# Patient Record
Sex: MALE | Race: WHITE | NOT HISPANIC OR LATINO | URBAN - METROPOLITAN AREA
[De-identification: names, ages, dates, MRNs, and addresses within clinical notes are randomized per-mention and may not be internally consistent; named-entity substitution may affect disease eponyms.]

---

## 2017-02-16 ENCOUNTER — OFFICE (AMBULATORY)
Dept: URBAN - METROPOLITAN AREA CLINIC 64 | Facility: CLINIC | Age: 62
End: 2017-02-16

## 2017-02-16 VITALS
WEIGHT: 220 LBS | HEIGHT: 71 IN | SYSTOLIC BLOOD PRESSURE: 119 MMHG | DIASTOLIC BLOOD PRESSURE: 63 MMHG | HEART RATE: 75 BPM

## 2017-02-16 DIAGNOSIS — Z86.010 PERSONAL HISTORY OF COLONIC POLYPS: ICD-10-CM

## 2017-02-16 DIAGNOSIS — K21.9 GASTRO-ESOPHAGEAL REFLUX DISEASE WITHOUT ESOPHAGITIS: ICD-10-CM

## 2017-02-16 DIAGNOSIS — R13.10 DYSPHAGIA, UNSPECIFIED: ICD-10-CM

## 2017-02-16 PROCEDURE — 99202 OFFICE O/P NEW SF 15 MIN: CPT | Performed by: INTERNAL MEDICINE

## 2017-02-16 RX ORDER — RANITIDINE 300 MG/1
300 TABLET ORAL
Qty: 30 | Refills: 7 | Status: ACTIVE

## 2017-03-15 ENCOUNTER — OFFICE (AMBULATORY)
Dept: URBAN - METROPOLITAN AREA CLINIC 64 | Facility: CLINIC | Age: 62
End: 2017-03-15

## 2017-03-15 ENCOUNTER — ON CAMPUS - OUTPATIENT (AMBULATORY)
Dept: URBAN - METROPOLITAN AREA HOSPITAL 2 | Facility: HOSPITAL | Age: 62
End: 2017-03-15

## 2017-03-15 VITALS
DIASTOLIC BLOOD PRESSURE: 80 MMHG | HEART RATE: 78 BPM | OXYGEN SATURATION: 94 % | DIASTOLIC BLOOD PRESSURE: 83 MMHG | DIASTOLIC BLOOD PRESSURE: 110 MMHG | SYSTOLIC BLOOD PRESSURE: 136 MMHG | SYSTOLIC BLOOD PRESSURE: 124 MMHG | RESPIRATION RATE: 16 BRPM | DIASTOLIC BLOOD PRESSURE: 111 MMHG | SYSTOLIC BLOOD PRESSURE: 134 MMHG | HEART RATE: 73 BPM | DIASTOLIC BLOOD PRESSURE: 88 MMHG | DIASTOLIC BLOOD PRESSURE: 78 MMHG | HEART RATE: 79 BPM | RESPIRATION RATE: 18 BRPM | SYSTOLIC BLOOD PRESSURE: 107 MMHG | SYSTOLIC BLOOD PRESSURE: 167 MMHG | SYSTOLIC BLOOD PRESSURE: 132 MMHG | DIASTOLIC BLOOD PRESSURE: 98 MMHG | DIASTOLIC BLOOD PRESSURE: 93 MMHG | WEIGHT: 225 LBS | DIASTOLIC BLOOD PRESSURE: 75 MMHG | SYSTOLIC BLOOD PRESSURE: 148 MMHG | HEART RATE: 74 BPM | OXYGEN SATURATION: 95 % | HEART RATE: 76 BPM | TEMPERATURE: 98.3 F | SYSTOLIC BLOOD PRESSURE: 143 MMHG | SYSTOLIC BLOOD PRESSURE: 129 MMHG | DIASTOLIC BLOOD PRESSURE: 86 MMHG | HEART RATE: 75 BPM | OXYGEN SATURATION: 97 % | HEIGHT: 71 IN | HEART RATE: 96 BPM | SYSTOLIC BLOOD PRESSURE: 147 MMHG | HEART RATE: 77 BPM | HEART RATE: 83 BPM | OXYGEN SATURATION: 93 %

## 2017-03-15 DIAGNOSIS — K20.8 OTHER ESOPHAGITIS: ICD-10-CM

## 2017-03-15 DIAGNOSIS — D12.3 BENIGN NEOPLASM OF TRANSVERSE COLON: ICD-10-CM

## 2017-03-15 DIAGNOSIS — Z86.010 PERSONAL HISTORY OF COLONIC POLYPS: ICD-10-CM

## 2017-03-15 DIAGNOSIS — D12.0 BENIGN NEOPLASM OF CECUM: ICD-10-CM

## 2017-03-15 DIAGNOSIS — K57.30 DIVERTICULOSIS OF LARGE INTESTINE WITHOUT PERFORATION OR ABS: ICD-10-CM

## 2017-03-15 DIAGNOSIS — D12.5 BENIGN NEOPLASM OF SIGMOID COLON: ICD-10-CM

## 2017-03-15 DIAGNOSIS — K64.1 SECOND DEGREE HEMORRHOIDS: ICD-10-CM

## 2017-03-15 DIAGNOSIS — K21.9 GASTRO-ESOPHAGEAL REFLUX DISEASE WITHOUT ESOPHAGITIS: ICD-10-CM

## 2017-03-15 DIAGNOSIS — R13.10 DYSPHAGIA, UNSPECIFIED: ICD-10-CM

## 2017-03-15 DIAGNOSIS — K22.2 ESOPHAGEAL OBSTRUCTION: ICD-10-CM

## 2017-03-15 PROBLEM — K22.8 OTHER SPECIFIED DISEASES OF ESOPHAGUS: Status: ACTIVE | Noted: 2017-03-15

## 2017-03-15 LAB
GI HISTOLOGY: A. UNSPECIFIED: (no result)
GI HISTOLOGY: PDF REPORT: (no result)

## 2017-03-15 PROCEDURE — 43235 EGD DIAGNOSTIC BRUSH WASH: CPT | Performed by: INTERNAL MEDICINE

## 2017-03-15 PROCEDURE — 88305 TISSUE EXAM BY PATHOLOGIST: CPT | Performed by: INTERNAL MEDICINE

## 2017-03-15 PROCEDURE — 45385 COLONOSCOPY W/LESION REMOVAL: CPT | Mod: 33 | Performed by: INTERNAL MEDICINE

## 2017-03-15 RX ORDER — PANTOPRAZOLE SODIUM 40 MG/1
40 TABLET, DELAYED RELEASE ORAL
Qty: 90 | Refills: 3 | Status: COMPLETED
Start: 2017-03-15 | End: 2017-12-13

## 2017-03-15 RX ADMIN — PROPOFOL: 10 INJECTION, EMULSION INTRAVENOUS at 13:48

## 2017-11-13 ENCOUNTER — OFFICE (AMBULATORY)
Dept: URBAN - METROPOLITAN AREA CLINIC 64 | Facility: CLINIC | Age: 62
End: 2017-11-13

## 2017-11-13 VITALS
WEIGHT: 233 LBS | HEIGHT: 71 IN | SYSTOLIC BLOOD PRESSURE: 137 MMHG | DIASTOLIC BLOOD PRESSURE: 93 MMHG | HEART RATE: 97 BPM

## 2017-11-13 DIAGNOSIS — K22.10 ULCER OF ESOPHAGUS WITHOUT BLEEDING: ICD-10-CM

## 2017-11-13 DIAGNOSIS — R13.10 DYSPHAGIA, UNSPECIFIED: ICD-10-CM

## 2017-11-13 DIAGNOSIS — R15.0 INCOMPLETE DEFECATION: ICD-10-CM

## 2017-11-13 PROCEDURE — 99214 OFFICE O/P EST MOD 30 MIN: CPT | Performed by: NURSE PRACTITIONER

## 2017-12-13 ENCOUNTER — OFFICE (AMBULATORY)
Dept: URBAN - METROPOLITAN AREA CLINIC 64 | Facility: CLINIC | Age: 62
End: 2017-12-13
Payer: COMMERCIAL

## 2017-12-13 ENCOUNTER — ON CAMPUS - OUTPATIENT (AMBULATORY)
Dept: URBAN - METROPOLITAN AREA HOSPITAL 2 | Facility: HOSPITAL | Age: 62
End: 2017-12-13

## 2017-12-13 VITALS
OXYGEN SATURATION: 94 % | WEIGHT: 230 LBS | HEIGHT: 71 IN | DIASTOLIC BLOOD PRESSURE: 99 MMHG | HEART RATE: 79 BPM | HEART RATE: 70 BPM | OXYGEN SATURATION: 95 % | RESPIRATION RATE: 18 BRPM | DIASTOLIC BLOOD PRESSURE: 81 MMHG | SYSTOLIC BLOOD PRESSURE: 140 MMHG | SYSTOLIC BLOOD PRESSURE: 130 MMHG | RESPIRATION RATE: 16 BRPM | SYSTOLIC BLOOD PRESSURE: 135 MMHG | DIASTOLIC BLOOD PRESSURE: 89 MMHG | SYSTOLIC BLOOD PRESSURE: 177 MMHG | HEART RATE: 64 BPM | TEMPERATURE: 97.7 F | HEART RATE: 68 BPM | DIASTOLIC BLOOD PRESSURE: 95 MMHG | OXYGEN SATURATION: 96 % | OXYGEN SATURATION: 97 % | SYSTOLIC BLOOD PRESSURE: 151 MMHG | SYSTOLIC BLOOD PRESSURE: 136 MMHG | DIASTOLIC BLOOD PRESSURE: 98 MMHG | DIASTOLIC BLOOD PRESSURE: 85 MMHG

## 2017-12-13 DIAGNOSIS — K22.70 BARRETT'S ESOPHAGUS WITHOUT DYSPLASIA: ICD-10-CM

## 2017-12-13 DIAGNOSIS — K20.8 OTHER ESOPHAGITIS: ICD-10-CM

## 2017-12-13 DIAGNOSIS — K22.2 ESOPHAGEAL OBSTRUCTION: ICD-10-CM

## 2017-12-13 DIAGNOSIS — K22.10 ULCER OF ESOPHAGUS WITHOUT BLEEDING: ICD-10-CM

## 2017-12-13 DIAGNOSIS — R13.10 DYSPHAGIA, UNSPECIFIED: ICD-10-CM

## 2017-12-13 LAB
GI HISTOLOGY: A. UNSPECIFIED: (no result)
GI HISTOLOGY: PDF REPORT: (no result)

## 2017-12-13 PROCEDURE — 43239 EGD BIOPSY SINGLE/MULTIPLE: CPT | Performed by: INTERNAL MEDICINE

## 2017-12-13 PROCEDURE — 88305 TISSUE EXAM BY PATHOLOGIST: CPT | Performed by: INTERNAL MEDICINE

## 2017-12-13 RX ORDER — PANTOPRAZOLE SODIUM 40 MG/1
40 TABLET, DELAYED RELEASE ORAL
Qty: 90 | Refills: 3 | Status: COMPLETED
Start: 2017-03-15 | End: 2017-12-13

## 2017-12-13 RX ORDER — OMEPRAZOLE 40 MG/1
40 CAPSULE, DELAYED RELEASE ORAL
Qty: 90 | Refills: 3 | Status: ACTIVE
Start: 2017-12-13

## 2017-12-13 RX ADMIN — PROPOFOL: 10 INJECTION, EMULSION INTRAVENOUS at 08:27

## 2018-03-08 ENCOUNTER — ON CAMPUS - OUTPATIENT (AMBULATORY)
Dept: URBAN - METROPOLITAN AREA HOSPITAL 2 | Facility: HOSPITAL | Age: 63
End: 2018-03-08

## 2018-03-08 VITALS
SYSTOLIC BLOOD PRESSURE: 134 MMHG | WEIGHT: 230 LBS | SYSTOLIC BLOOD PRESSURE: 140 MMHG | HEART RATE: 67 BPM | HEART RATE: 72 BPM | SYSTOLIC BLOOD PRESSURE: 146 MMHG | SYSTOLIC BLOOD PRESSURE: 125 MMHG | RESPIRATION RATE: 18 BRPM | SYSTOLIC BLOOD PRESSURE: 144 MMHG | TEMPERATURE: 97.5 F | OXYGEN SATURATION: 95 % | DIASTOLIC BLOOD PRESSURE: 79 MMHG | DIASTOLIC BLOOD PRESSURE: 86 MMHG | DIASTOLIC BLOOD PRESSURE: 75 MMHG | OXYGEN SATURATION: 96 % | DIASTOLIC BLOOD PRESSURE: 90 MMHG | RESPIRATION RATE: 16 BRPM | DIASTOLIC BLOOD PRESSURE: 76 MMHG | HEART RATE: 66 BPM | DIASTOLIC BLOOD PRESSURE: 91 MMHG | OXYGEN SATURATION: 97 % | HEIGHT: 71 IN | SYSTOLIC BLOOD PRESSURE: 126 MMHG | OXYGEN SATURATION: 94 % | HEART RATE: 68 BPM

## 2018-03-08 DIAGNOSIS — K22.2 ESOPHAGEAL OBSTRUCTION: ICD-10-CM

## 2018-03-08 DIAGNOSIS — K22.70 BARRETT'S ESOPHAGUS WITHOUT DYSPLASIA: ICD-10-CM

## 2018-03-08 DIAGNOSIS — R13.10 DYSPHAGIA, UNSPECIFIED: ICD-10-CM

## 2018-03-08 DIAGNOSIS — K21.9 GASTRO-ESOPHAGEAL REFLUX DISEASE WITHOUT ESOPHAGITIS: ICD-10-CM

## 2018-03-08 PROCEDURE — 43450 DILATE ESOPHAGUS 1/MULT PASS: CPT | Performed by: INTERNAL MEDICINE

## 2018-03-08 PROCEDURE — 43235 EGD DIAGNOSTIC BRUSH WASH: CPT | Performed by: INTERNAL MEDICINE

## 2018-03-08 RX ADMIN — PROPOFOL: 10 INJECTION, EMULSION INTRAVENOUS at 07:57

## 2018-08-04 ENCOUNTER — HOSPITAL ENCOUNTER (EMERGENCY)
Facility: HOSPITAL | Age: 63
Discharge: HOME | End: 2018-08-04
Attending: EMERGENCY MEDICINE | Admitting: EMERGENCY MEDICINE

## 2018-08-04 VITALS
SYSTOLIC BLOOD PRESSURE: 135 MMHG | HEART RATE: 70 BPM | HEIGHT: 71 IN | BODY MASS INDEX: 30.8 KG/M2 | DIASTOLIC BLOOD PRESSURE: 84 MMHG | TEMPERATURE: 97.5 F | RESPIRATION RATE: 16 BRPM | OXYGEN SATURATION: 95 % | WEIGHT: 220 LBS

## 2018-08-04 DIAGNOSIS — R33.9 URINARY RETENTION: Primary | ICD-10-CM

## 2018-08-04 DIAGNOSIS — Z56.6 STRESS AT WORK: ICD-10-CM

## 2018-08-04 LAB
ALBUMIN SERPL-MCNC: 4.1 G/DL (ref 3.5–5.2)
ALBUMIN/GLOB SERPL: 1.7 G/DL
ALP SERPL-CCNC: 83 U/L (ref 39–117)
ALT SERPL W P-5'-P-CCNC: 19 U/L (ref 1–41)
AMPHET+METHAMPHET UR QL: NEGATIVE
ANION GAP SERPL CALCULATED.3IONS-SCNC: 16.8 MMOL/L
AST SERPL-CCNC: 18 U/L (ref 1–40)
BARBITURATES UR QL SCN: NEGATIVE
BASOPHILS # BLD AUTO: 0.02 10*3/MM3 (ref 0–0.2)
BASOPHILS NFR BLD AUTO: 0.2 % (ref 0–1.5)
BENZODIAZ UR QL SCN: NEGATIVE
BILIRUB SERPL-MCNC: 0.5 MG/DL (ref 0.1–1.2)
BILIRUB UR QL STRIP: NEGATIVE
BUN BLD-MCNC: 18 MG/DL (ref 8–23)
BUN/CREAT SERPL: 14.8 (ref 7–25)
CALCIUM SPEC-SCNC: 9.4 MG/DL (ref 8.6–10.5)
CANNABINOIDS SERPL QL: NEGATIVE
CHLORIDE SERPL-SCNC: 102 MMOL/L (ref 98–107)
CLARITY UR: CLEAR
CO2 SERPL-SCNC: 20.2 MMOL/L (ref 22–29)
COCAINE UR QL: NEGATIVE
COLOR UR: YELLOW
CREAT BLD-MCNC: 1.22 MG/DL (ref 0.76–1.27)
DEPRECATED RDW RBC AUTO: 46.4 FL (ref 37–54)
EOSINOPHIL # BLD AUTO: 0 10*3/MM3 (ref 0–0.7)
EOSINOPHIL NFR BLD AUTO: 0 % (ref 0.3–6.2)
ERYTHROCYTE [DISTWIDTH] IN BLOOD BY AUTOMATED COUNT: 14.3 % (ref 11.5–14.5)
ETHANOL BLD-MCNC: <10 MG/DL (ref 0–10)
ETHANOL UR QL: <0.01 %
GFR SERPL CREATININE-BSD FRML MDRD: 60 ML/MIN/1.73
GLOBULIN UR ELPH-MCNC: 2.4 GM/DL
GLUCOSE BLD-MCNC: 100 MG/DL (ref 65–99)
GLUCOSE UR STRIP-MCNC: NEGATIVE MG/DL
HCT VFR BLD AUTO: 44.5 % (ref 40.4–52.2)
HGB BLD-MCNC: 14.2 G/DL (ref 13.7–17.6)
HGB UR QL STRIP.AUTO: NEGATIVE
HOLD SPECIMEN: NORMAL
HOLD SPECIMEN: NORMAL
IMM GRANULOCYTES # BLD: 0.02 10*3/MM3 (ref 0–0.03)
IMM GRANULOCYTES NFR BLD: 0.2 % (ref 0–0.5)
KETONES UR QL STRIP: NEGATIVE
LEUKOCYTE ESTERASE UR QL STRIP.AUTO: NEGATIVE
LYMPHOCYTES # BLD AUTO: 1.41 10*3/MM3 (ref 0.9–4.8)
LYMPHOCYTES NFR BLD AUTO: 14.4 % (ref 19.6–45.3)
MCH RBC QN AUTO: 28.3 PG (ref 27–32.7)
MCHC RBC AUTO-ENTMCNC: 31.9 G/DL (ref 32.6–36.4)
MCV RBC AUTO: 88.8 FL (ref 79.8–96.2)
METHADONE UR QL SCN: NEGATIVE
MONOCYTES # BLD AUTO: 0.5 10*3/MM3 (ref 0.2–1.2)
MONOCYTES NFR BLD AUTO: 5.1 % (ref 5–12)
NEUTROPHILS # BLD AUTO: 7.86 10*3/MM3 (ref 1.9–8.1)
NEUTROPHILS NFR BLD AUTO: 80.3 % (ref 42.7–76)
NITRITE UR QL STRIP: NEGATIVE
OPIATES UR QL: NEGATIVE
OXYCODONE UR QL SCN: NEGATIVE
PH UR STRIP.AUTO: 5.5 [PH] (ref 5–8)
PLATELET # BLD AUTO: 209 10*3/MM3 (ref 140–500)
PMV BLD AUTO: 9.5 FL (ref 6–12)
POTASSIUM BLD-SCNC: 3.9 MMOL/L (ref 3.5–5.2)
PROT SERPL-MCNC: 6.5 G/DL (ref 6–8.5)
PROT UR QL STRIP: NEGATIVE
RBC # BLD AUTO: 5.01 10*6/MM3 (ref 4.6–6)
SODIUM BLD-SCNC: 139 MMOL/L (ref 136–145)
SP GR UR STRIP: 1.01 (ref 1–1.03)
UROBILINOGEN UR QL STRIP: NORMAL
WBC NRBC COR # BLD: 9.79 10*3/MM3 (ref 4.5–10.7)
WHOLE BLOOD HOLD SPECIMEN: NORMAL
WHOLE BLOOD HOLD SPECIMEN: NORMAL

## 2018-08-04 PROCEDURE — 85025 COMPLETE CBC W/AUTO DIFF WBC: CPT | Performed by: NURSE PRACTITIONER

## 2018-08-04 PROCEDURE — 80307 DRUG TEST PRSMV CHEM ANLYZR: CPT | Performed by: EMERGENCY MEDICINE

## 2018-08-04 PROCEDURE — 51798 US URINE CAPACITY MEASURE: CPT

## 2018-08-04 PROCEDURE — 51702 INSERT TEMP BLADDER CATH: CPT

## 2018-08-04 PROCEDURE — 90791 PSYCH DIAGNOSTIC EVALUATION: CPT

## 2018-08-04 PROCEDURE — 81003 URINALYSIS AUTO W/O SCOPE: CPT | Performed by: NURSE PRACTITIONER

## 2018-08-04 PROCEDURE — 99284 EMERGENCY DEPT VISIT MOD MDM: CPT

## 2018-08-04 PROCEDURE — 80053 COMPREHEN METABOLIC PANEL: CPT | Performed by: NURSE PRACTITIONER

## 2018-08-04 RX ORDER — RANITIDINE 300 MG/1
300 TABLET ORAL NIGHTLY
COMMUNITY
End: 2021-09-13

## 2018-08-04 RX ORDER — OMEPRAZOLE 40 MG/1
40 CAPSULE, DELAYED RELEASE ORAL DAILY
COMMUNITY
End: 2021-09-13

## 2018-08-04 RX ORDER — TRAZODONE HYDROCHLORIDE 50 MG/1
50 TABLET ORAL NIGHTLY PRN
COMMUNITY
End: 2018-08-27 | Stop reason: HOSPADM

## 2018-08-04 RX ORDER — SODIUM CHLORIDE 0.9 % (FLUSH) 0.9 %
10 SYRINGE (ML) INJECTION AS NEEDED
Status: DISCONTINUED | OUTPATIENT
Start: 2018-08-04 | End: 2018-08-04 | Stop reason: HOSPADM

## 2018-08-04 RX ORDER — SILODOSIN 8 MG/1
8 CAPSULE ORAL
COMMUNITY
End: 2021-09-13

## 2018-08-04 RX ORDER — PAROXETINE HYDROCHLORIDE 20 MG/1
40 TABLET, FILM COATED ORAL EVERY MORNING
Status: ON HOLD | COMMUNITY
End: 2018-08-15

## 2018-08-04 SDOH — HEALTH STABILITY - MENTAL HEALTH: OTHER PHYSICAL AND MENTAL STRAIN RELATED TO WORK: Z56.6

## 2018-08-04 NOTE — ED NOTES
Access back at bedside to give dc instructions along w ed md      Shah retention bag has been changed to a leg bag and initial bag sent home w pt in personal belonging bag. Catheter care education given and both pt and wife confirm understanding.        Santa Payton, RN  08/04/18 7825

## 2018-08-04 NOTE — ED PROVIDER NOTES
EMERGENCY DEPARTMENT ENCOUNTER    Room Number:  28/28  Date seen:  8/4/2018  Time seen: 1:41 PM  PCP: Damon Bradford MD  Historian: Pt      HPI:  Chief Complaint: Urinary Retention  A complete HPI/ROS/PMH/PSH/SH/FH are unobtainable due to: N/A  Context: Chilo Hurt is a 63 y.o. male who presents to the ED c/o chronic urinary retention issues that got progressively wore last night. Pt has stark catheter. Pt confirms lower abd pain, trouble sleeping, difficulty urinating. Pt denies fever, N/V. Pt states he has been under a lot of stress. Pt denies any suicidal thoughts, hallucinations. Pt's wife wants a psych evaluation because she states pt's mind continues racing all the time and pt cannot sleep at night.     Pain Location: lower abd   Radiation: none  Quality: pain  Intensity/Severity: moderate   Duration: last night but is chronic problem       PAST MEDICAL HISTORY  Active Ambulatory Problems     Diagnosis Date Noted   • No Active Ambulatory Problems     Resolved Ambulatory Problems     Diagnosis Date Noted   • No Resolved Ambulatory Problems     Past Medical History:   Diagnosis Date   • Depression    • GERD (gastroesophageal reflux disease)    • Prostate enlargement    • Urinary retention          PAST SURGICAL HISTORY  History reviewed. No pertinent surgical history.      FAMILY HISTORY  History reviewed. No pertinent family history.      SOCIAL HISTORY  Social History     Social History   • Marital status:      Spouse name: N/A   • Number of children: N/A   • Years of education: N/A     Occupational History   • Not on file.     Social History Main Topics   • Smoking status: Never Smoker   • Smokeless tobacco: Not on file   • Alcohol use No   • Drug use: Unknown   • Sexual activity: Not on file     Other Topics Concern   • Not on file     Social History Narrative   • No narrative on file         ALLERGIES  Patient has no known allergies.        REVIEW OF SYSTEMS  Review of Systems    Constitutional: Negative for fever.   HENT: Negative for sore throat.    Respiratory: Negative for shortness of breath.    Cardiovascular: Negative for chest pain.   Gastrointestinal: Positive for abdominal pain (lower). Negative for nausea and vomiting.   Endocrine: Negative for polyuria.   Genitourinary: Positive for difficulty urinating. Negative for dysuria.        Urinary retention issues.    Musculoskeletal: Negative for neck pain.   Skin: Negative for rash.   Neurological: Negative for headaches.        Trouble sleeping.    Psychiatric/Behavioral: Negative for hallucinations and suicidal ideas.   All other systems reviewed and are negative.           PHYSICAL EXAM  ED Triage Vitals   Temp Heart Rate Resp BP SpO2   08/04/18 1237 08/04/18 1237 08/04/18 1248 08/04/18 1248 08/04/18 1237   97.2 °F (36.2 °C) (!) 125 22 101/90 91 %      Temp src Heart Rate Source Patient Position BP Location FiO2 (%)   08/04/18 1237 08/04/18 1237 -- 08/04/18 1248 --   Tympanic Monitor  Right arm          GENERAL: not distressed  HENT: nares patent  EYES: no scleral icterus  CV: regular rhythm, regular rate  RESPIRATORY: normal effort, CTAB  ABDOMEN: soft, nontender  MUSCULOSKELETAL: no deformity  NEURO: alert, ARCHER, FC  SKIN: warm, dry  : stark draining CYU    Vital signs and nursing notes reviewed.          LAB RESULTS  Recent Results (from the past 24 hour(s))   Urinalysis With Microscopic If Indicated (No Culture) - Urine, Catheter    Collection Time: 08/04/18  1:26 PM   Result Value Ref Range    Color, UA Yellow Yellow, Straw    Appearance, UA Clear Clear    pH, UA 5.5 5.0 - 8.0    Specific Gravity, UA 1.015 1.005 - 1.030    Glucose, UA Negative Negative    Ketones, UA Negative Negative    Bilirubin, UA Negative Negative    Blood, UA Negative Negative    Protein, UA Negative Negative    Leuk Esterase, UA Negative Negative    Nitrite, UA Negative Negative    Urobilinogen, UA 0.2 E.U./dL 0.2 - 1.0 E.U./dL   Urine Drug Screen -  Urine, Clean Catch    Collection Time: 08/04/18  1:26 PM   Result Value Ref Range    Amphet/Methamphet, Screen Negative Negative    Barbiturates Screen, Urine Negative Negative    Benzodiazepine Screen, Urine Negative Negative    Cocaine Screen, Urine Negative Negative    Opiate Screen Negative Negative    THC, Screen, Urine Negative Negative    Methadone Screen, Urine Negative Negative    Oxycodone Screen, Urine Negative Negative   Comprehensive Metabolic Panel    Collection Time: 08/04/18  2:03 PM   Result Value Ref Range    Glucose 100 (H) 65 - 99 mg/dL    BUN 18 8 - 23 mg/dL    Creatinine 1.22 0.76 - 1.27 mg/dL    Sodium 139 136 - 145 mmol/L    Potassium 3.9 3.5 - 5.2 mmol/L    Chloride 102 98 - 107 mmol/L    CO2 20.2 (L) 22.0 - 29.0 mmol/L    Calcium 9.4 8.6 - 10.5 mg/dL    Total Protein 6.5 6.0 - 8.5 g/dL    Albumin 4.10 3.50 - 5.20 g/dL    ALT (SGPT) 19 1 - 41 U/L    AST (SGOT) 18 1 - 40 U/L    Alkaline Phosphatase 83 39 - 117 U/L    Total Bilirubin 0.5 0.1 - 1.2 mg/dL    eGFR Non African Amer 60 (L) >60 mL/min/1.73    Globulin 2.4 gm/dL    A/G Ratio 1.7 g/dL    BUN/Creatinine Ratio 14.8 7.0 - 25.0    Anion Gap 16.8 mmol/L   CBC Auto Differential    Collection Time: 08/04/18  2:03 PM   Result Value Ref Range    WBC 9.79 4.50 - 10.70 10*3/mm3    RBC 5.01 4.60 - 6.00 10*6/mm3    Hemoglobin 14.2 13.7 - 17.6 g/dL    Hematocrit 44.5 40.4 - 52.2 %    MCV 88.8 79.8 - 96.2 fL    MCH 28.3 27.0 - 32.7 pg    MCHC 31.9 (L) 32.6 - 36.4 g/dL    RDW 14.3 11.5 - 14.5 %    RDW-SD 46.4 37.0 - 54.0 fl    MPV 9.5 6.0 - 12.0 fL    Platelets 209 140 - 500 10*3/mm3    Neutrophil % 80.3 (H) 42.7 - 76.0 %    Lymphocyte % 14.4 (L) 19.6 - 45.3 %    Monocyte % 5.1 5.0 - 12.0 %    Eosinophil % 0.0 (L) 0.3 - 6.2 %    Basophil % 0.2 0.0 - 1.5 %    Immature Grans % 0.2 0.0 - 0.5 %    Neutrophils, Absolute 7.86 1.90 - 8.10 10*3/mm3    Lymphocytes, Absolute 1.41 0.90 - 4.80 10*3/mm3    Monocytes, Absolute 0.50 0.20 - 1.20 10*3/mm3     Eosinophils, Absolute 0.00 0.00 - 0.70 10*3/mm3    Basophils, Absolute 0.02 0.00 - 0.20 10*3/mm3    Immature Grans, Absolute 0.02 0.00 - 0.03 10*3/mm3   Light Blue Top    Collection Time: 08/04/18  2:03 PM   Result Value Ref Range    Extra Tube hold for add-on    Green Top (Gel)    Collection Time: 08/04/18  2:03 PM   Result Value Ref Range    Extra Tube Hold for add-ons.    Lavender Top    Collection Time: 08/04/18  2:03 PM   Result Value Ref Range    Extra Tube hold for add-on    Gold Top - SST    Collection Time: 08/04/18  2:03 PM   Result Value Ref Range    Extra Tube Hold for add-ons.    Ethanol    Collection Time: 08/04/18  2:03 PM   Result Value Ref Range    Ethanol <10 0 - 10 mg/dL    Ethanol % <0.010 %       Ordered the above labs and reviewed the results.        RADIOLOGY  No orders to display          Ordered the above noted radiological studies. Reviewed by me in PACS.          PROCEDURES  Procedures            MEDICATIONS GIVEN IN ER  Medications   sodium chloride 0.9 % flush 10 mL (not administered)             PROGRESS AND CONSULTS  ED Course as of Aug 04 1639   Sat Aug 04, 2018   1304 Urinary retention x yesterday evening. Hx enlarged prostate. Been very stressed, not sleeping, wife asking for psych eval secondary to this. Pt is anxious, pacing. No fever or vomiting. No otc supplements. Started trazodone last night to help sleep, rx by primary care.   [JS]      ED Course User Index  [JS] Charley Vaughn, APRN     1351  Urine Drug Screen, Ethanol ordered.     1430  Rechecked pt. Pt is resting comfortably. Pt states he would like to do a psych evaluation. Discussed the plan to call [facility]. Pt understands and agrees with the plan, all questions answered. Call placed to Psych/[facility].         MEDICAL DECISION MAKING      MDM  Number of Diagnoses or Management Options  Stress at work:   Urinary retention:   Diagnosis management comments: Patient presents urinary retention.  Has history of BPH.  He is  already on alpha antagonist.  Urine is not infected.  Pain has been fully relieved with placement of indwelling Shah catheter.  Patient given a leg bag.  He is very stressed at work and has some depressive symptoms.  Psychiatry has seen him here in the emergency department.  They are coordinating outpatient follow-up.  Patient declined intensive outpatient therapy.       Amount and/or Complexity of Data Reviewed  Clinical lab tests: ordered and reviewed (Ethanol= <0.010)  Decide to obtain previous medical records or to obtain history from someone other than the patient: yes  Review and summarize past medical records: yes  Discuss the patient with other providers: yes ([facility] psych RN)               DIAGNOSIS  Final diagnoses:   Urinary retention   Stress at work         DISPOSITION  Discharge            Latest Documented Vital Signs:  As of 4:39 PM  BP- 116/90 HR- 71 Temp- 97.2 °F (36.2 °C) (Tympanic) O2 sat- 98%        --  Documentation assistance provided by leela Gordon for Dr. Livan MD.  Information recorded by the scribe was done at my direction and has been verified and validated by me.                 Danii Hardy  08/04/18 1708       Edward Licona II, MD  08/04/18 4007       Edward Licona II, MD  08/04/18 8396

## 2018-08-04 NOTE — CONSULTS
A 62 yo white male seen in ED rm # 28 accompanied by his wife, son and dght. Pt states he's under a lot of stress. Can't sleep, poor concentration for the last month, feels distant from everyone. Pt feels he's under a lot of stress with his job, has apparently not been getting reports and other paperwork done in a timely manner and has piled up and now feels overwhelmed and feels there will be some consequences with it. No energy to deal with problems, can't focus to get the job done. He worries about his family and how they will be impacted with his problems at work. Poor sleep, wakes at all hours during the night. No appetite for a week or two. 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 Denies any thoughts of harm to self or others. Pt did see a psychologist for the first time this past Thursday and has another appt this next Thursday. Has a very supportive family.  Pt and his wife have been  for 38 yrs, they have a so and dght. Pt is a . [employer] by [employer] and active in his [employer]. Used to like to fish and hunt but no interest recently.  Talked with pt and his family about treatment options. Discussed IOP but he doesn't feel he can take the off from work. Talked with him about seeing a psychiatrist for medications and increasing his therapy sessions to twice a week with his psychologist. Pt and wife agreeable to this plan.  Discussed with Dr. Licona and he agreed with d/c plan and pt to increase his Trazodone to 100 mg/night.

## 2018-08-04 NOTE — ED TRIAGE NOTES
Per wife, pt has not been able to urinate. Pt had scope of bladder Thursday. PSA level elevated.  Pt has not been sleeping due to trying to urinate.    Per wife, pt has been under a lot of stress and is requesting psych eval. Pt and wife went to counseling on Thursday. They were told that the need to be evaluated by psych to adjust meds.

## 2018-08-13 ENCOUNTER — OFFICE VISIT (OUTPATIENT)
Dept: PSYCHIATRY | Facility: HOSPITAL | Age: 63
End: 2018-08-13

## 2018-08-13 DIAGNOSIS — F32.3 SEVERE SINGLE CURRENT EPISODE OF MAJOR DEPRESSIVE DISORDER, WITH PSYCHOTIC FEATURES (HCC): Primary | ICD-10-CM

## 2018-08-13 PROCEDURE — S9480 INTENSIVE OUTPATIENT PSYCHIA: HCPCS | Performed by: COUNSELOR

## 2018-08-13 NOTE — TREATMENT PLAN
Multi-Disciplinary Problems (from Behavioral Health Treatment Plan)    Active Problems     Problem: Depression  Start Date: 08/13/18     Problem Details:  The patient self-scales this problem as a 10 with 10 being the worst.       Goal Priority Start Date Expected End Date End Date    Patient will demonstrate the ability to initiate new constructive life skills outside of sessions on a consistent basis. -- 08/13/18 -- --    Goal Details:  Progress toward goal:  Not appropriate to rate progress toward goal since this is the initial treatment plan.       Goal Intervention Frequency Start Date End Date    Assist patient in setting attainable activities of daily living goals. PRN 08/13/18 --    Goal Intervention Frequency Start Date End Date    Provide education about depression Weekly 08/13/18 --    Intervention Details:  Duration of treatment until until discharged.       Goal Intervention Frequency Start Date End Date    Assist patient in developing healthy coping strategies. Weekly 08/13/18 --    Intervention Details:  Duration of treatment until until discharged.             Problem: Impaired Thinking  Start Date: 08/13/18    Problem Details:  The patient self-scales this problem as a 7 with 10 being the worst.       Goal Priority Start Date Expected End Date End Date    Patient will report diminishing or absence of hallucinations and/or delusions. -- 08/13/18 -- --    Goal Details:  Progress toward goal:  Not appropriate to rate progress toward goal since this is the initial treatment plan.       Goal Intervention Frequency Start Date End Date    Assist patient in restructuring irrational beliefs by reviewing reality based evidence and misinterpretations. Weekly 08/13/18 --    Intervention Details:  Duration of treatment until until discharged.                          I have discussed and reviewed this treatment plan with the patient.  It has been printed for signatures.

## 2018-08-13 NOTE — PROGRESS NOTES
"IDENTIFYING INFORMATION: The patient is a 63-year-old  white male admitted to the intensive outpatient program with severe depression    CHIEF COMPLAINT:  None given    INFORMANT:  Patient and chart    RELIABILITY:  Good    HISTORY OF PRESENT ILLNESS: The patient is a 63-year-old  white male admitted to the intensive outpatient program with increasing depression without suicidal ideation.  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  He reports he has held that job for the past 6 years.  The patient reports that he has, in the past several months, been negligent in his duties, documenting poorly and is fearful that he will be accused of embezzlement.  The patient denies suicidal or homicidal ideation.  He denies recent changes in sleep does report loss of appetite and some results and weight loss.  Patient reports a history of positive response to paroxetine.  This medication was recently increased to 60 mg daily, the patient is noted little change at this point.  The patient denies prior psychiatric hospitalization denies psychiatric contact.  He denies current suicidal homicidal elation denies prior suicide attempts or gestures.  When seen today the patient is noted to be psychomotorically retarded with ruminative worry regarding his job situation, almost bordering on psychotic guilt and paranoia.    PAST PSYCHIATRIC HISTORY: As above    PAST MEDICAL HISTORY:  The patient is a history of prostate problems\" and is currently wearing a urine bag.  He also complains of GERD     MEDICATIONS: Prilosec, Paxil, Zantac's, Rapaflo, Desyrel    ALLERGIES:  None    FAMILY HISTORY:  A paternal aunt committed suicide    SOCIAL HISTORY: The patient lives with his wife.  The couple is childless.  He is graduate of [employer] with a specialist degree in education.  He denies use of alcohol tobacco or street drugs.  The patient reports in the past he enjoyed hunting and " fishing but has not pursued these hobbies in several years.    MENTAL STATUS EXAM: The patient is a slightly obese white male appearing his stated age.  He is no apparent physical distress of times examination.  He is awake alert and oriented all spheres.  His mood is dysphoric his affect blunted.  Speech is relevant and coherent.  There are no gross deficits memory cognition noted.  Intelligence is judged to be in the average range based on fund of knowledge, the patient's cooperative throughout the interview.  He denies current suicidal or homicidal ideation, and file denying active psychotic thinking as noted previously, the patient seems to have some delusional guilt and paranoia.  His judgment and insight appear to be mildly impaired.    ASSETS/LIABILITIES: Previous high level of functioning, motivation for change, supportive wife    DIAGNOSTIC IMPRESSION: Major depressive disorder severe recurrent with psychotic features, benign prostatic hypertrophy, GERD    PLAN:  The patient will, for the time being, continue aggressively dosed fluoxetine given his previous response to this medication.  I will add Abilify 2 mg daily in hopes of addressing the patient's delusional symptoms and augmenting the antidepressant effect of Paxil.  A medication switch with regards to the patient's antidepressant medication may need be to be considered.  Finally, I have discussed a safety plan with the patient with regards to possible need for admission the hospital, removal of firearms from the home, etc.  Estimated length of stay in programming is 15-20 days.

## 2018-08-13 NOTE — PROGRESS NOTES
Other     Information/activity     [id number]  Art Therapy - Bridge Drawing and processing past events, goals for future and relating to peers    Instructor Tang Macario, LPAT     10:42 AM     Patient Response Good participation

## 2018-08-13 NOTE — PROGRESS NOTES
"Group therapy   New to the group today.  Stepped out for a few minutes to meet with the MD.  Quiet until was asked direct questions.  Shared of depression and anxiety  \"but maybe I am  just using that as an excuse.\"    Often paused and had difficulty completing his sentences.  Vague regarding his stressors but alluded to work stressors.    "

## 2018-08-13 NOTE — PROGRESS NOTES
"Wrap-up  Day 1 IOP  Dealing with severe depression  Mood at 10 with 10 being the worse    Feeling sad or empty   Trouble with concentration, memory, or making decisions  Loss of interest in things you usually like to do  Easily distracted  Changes in normal sleep patterns ( decrease)  Increased nervous feelings/anxiety  Denies SI  Wife accompanied pt today and is very supportive  This writer told wife to lock up guns  Goal for later:  \"just get through the day\"    "

## 2018-08-14 ENCOUNTER — OFFICE VISIT (OUTPATIENT)
Dept: PSYCHIATRY | Facility: HOSPITAL | Age: 63
End: 2018-08-14

## 2018-08-14 DIAGNOSIS — F32.3 SEVERE SINGLE CURRENT EPISODE OF MAJOR DEPRESSIVE DISORDER, WITH PSYCHOTIC FEATURES (HCC): Primary | ICD-10-CM

## 2018-08-14 PROCEDURE — S9480 INTENSIVE OUTPATIENT PSYCHIA: HCPCS | Performed by: COUNSELOR

## 2018-08-14 NOTE — PROGRESS NOTES
"Wrap-up  Mood at 8 with 10 being the worse  Could not identify anything which was helpful  Was attentive as peers shared    Feeling sad or empty   Loss of interest in things you usually like to do  A decrease in normal appetite  Had difficulty setting goals for later today   \"just get through the day\"    "

## 2018-08-14 NOTE — PROGRESS NOTES
Mental Health Awareness Class   (10:30am-11:30am)  Information/activity     FAQs    Instructor Diana Bunch LCSW     11:34 AM     Patient Response Quiet  Patient new to the group

## 2018-08-14 NOTE — PROGRESS NOTES
Group therapy [id number]  Shared is feeling a slight bit better today.  Got Abilify RX filled and took it and had a nice visit with daughter who also has depression and his two grandchildren.  Pt was attentive as peers shared as not as fretful today.  Group members shared they could see some improvement with pt.   No SI.

## 2018-08-15 ENCOUNTER — APPOINTMENT (OUTPATIENT)
Dept: PSYCHIATRY | Facility: HOSPITAL | Age: 63
End: 2018-08-15

## 2018-08-15 ENCOUNTER — DOCUMENTATION (OUTPATIENT)
Dept: PSYCHIATRY | Facility: HOSPITAL | Age: 63
End: 2018-08-15

## 2018-08-15 ENCOUNTER — HOSPITAL ENCOUNTER (INPATIENT)
Facility: HOSPITAL | Age: 63
LOS: 12 days | Discharge: HOME | End: 2018-08-27
Attending: SPECIALIST | Admitting: SPECIALIST

## 2018-08-15 PROBLEM — K21.9 GASTROESOPHAGEAL REFLUX DISEASE WITHOUT ESOPHAGITIS: Status: ACTIVE | Noted: 2018-08-15

## 2018-08-15 PROBLEM — R33.8 BENIGN PROSTATIC HYPERPLASIA WITH URINARY RETENTION: Status: ACTIVE | Noted: 2018-08-15

## 2018-08-15 PROBLEM — F33.3 SEVERE RECURRENT MAJOR DEPRESSION WITH PSYCHOTIC FEATURES: Status: ACTIVE | Noted: 2018-08-15

## 2018-08-15 PROBLEM — N40.1 BENIGN PROSTATIC HYPERPLASIA WITH URINARY RETENTION: Status: ACTIVE | Noted: 2018-08-15

## 2018-08-15 PROBLEM — N40.0 BENIGN PROSTATIC HYPERPLASIA WITHOUT LOWER URINARY TRACT SYMPTOMS: Status: ACTIVE | Noted: 2018-08-15

## 2018-08-15 PROCEDURE — 84439 ASSAY OF FREE THYROXINE: CPT | Performed by: SPECIALIST

## 2018-08-15 PROCEDURE — 84443 ASSAY THYROID STIM HORMONE: CPT | Performed by: SPECIALIST

## 2018-08-15 PROCEDURE — 80061 LIPID PANEL: CPT | Performed by: SPECIALIST

## 2018-08-15 PROCEDURE — 90791 PSYCH DIAGNOSTIC EVALUATION: CPT | Performed by: SOCIAL WORKER

## 2018-08-15 PROCEDURE — 80053 COMPREHEN METABOLIC PANEL: CPT | Performed by: SPECIALIST

## 2018-08-15 PROCEDURE — 85025 COMPLETE CBC W/AUTO DIFF WBC: CPT | Performed by: SPECIALIST

## 2018-08-15 RX ORDER — TRAZODONE HYDROCHLORIDE 50 MG/1
50 TABLET ORAL NIGHTLY PRN
Status: DISCONTINUED | OUTPATIENT
Start: 2018-08-15 | End: 2018-08-17

## 2018-08-15 RX ORDER — ACETAMINOPHEN 325 MG/1
650 TABLET ORAL EVERY 4 HOURS PRN
Status: DISCONTINUED | OUTPATIENT
Start: 2018-08-15 | End: 2018-08-27 | Stop reason: HOSPADM

## 2018-08-15 RX ORDER — ARIPIPRAZOLE 2 MG/1
2 TABLET ORAL DAILY
COMMUNITY
End: 2018-08-27 | Stop reason: HOSPADM

## 2018-08-15 RX ORDER — PAROXETINE HYDROCHLORIDE 40 MG/1
40 TABLET, FILM COATED ORAL EVERY MORNING
COMMUNITY
End: 2018-08-27 | Stop reason: HOSPADM

## 2018-08-15 RX ORDER — ARIPIPRAZOLE 5 MG/1
5 TABLET ORAL DAILY
Status: DISCONTINUED | OUTPATIENT
Start: 2018-08-15 | End: 2018-08-27 | Stop reason: HOSPADM

## 2018-08-15 RX ORDER — ALUMINA, MAGNESIA, AND SIMETHICONE 2400; 2400; 240 MG/30ML; MG/30ML; MG/30ML
15 SUSPENSION ORAL EVERY 6 HOURS PRN
Status: DISCONTINUED | OUTPATIENT
Start: 2018-08-15 | End: 2018-08-27 | Stop reason: HOSPADM

## 2018-08-15 RX ORDER — TAMSULOSIN HYDROCHLORIDE 0.4 MG/1
0.4 CAPSULE ORAL NIGHTLY
Status: DISCONTINUED | OUTPATIENT
Start: 2018-08-15 | End: 2018-08-15

## 2018-08-15 RX ORDER — FAMOTIDINE 40 MG/1
40 TABLET, FILM COATED ORAL DAILY
Status: DISCONTINUED | OUTPATIENT
Start: 2018-08-15 | End: 2018-08-27 | Stop reason: HOSPADM

## 2018-08-15 RX ORDER — TAMSULOSIN HYDROCHLORIDE 0.4 MG/1
0.8 CAPSULE ORAL NIGHTLY
Status: DISCONTINUED | OUTPATIENT
Start: 2018-08-15 | End: 2018-08-27 | Stop reason: HOSPADM

## 2018-08-15 RX ORDER — PANTOPRAZOLE SODIUM 40 MG/1
40 TABLET, DELAYED RELEASE ORAL EVERY MORNING
Status: DISCONTINUED | OUTPATIENT
Start: 2018-08-16 | End: 2018-08-27 | Stop reason: HOSPADM

## 2018-08-15 RX ORDER — ARIPIPRAZOLE 2 MG/1
2 TABLET ORAL DAILY
Status: DISCONTINUED | OUTPATIENT
Start: 2018-08-15 | End: 2018-08-15

## 2018-08-15 RX ORDER — PAROXETINE HYDROCHLORIDE 20 MG/1
40 TABLET, FILM COATED ORAL EVERY MORNING
Status: DISCONTINUED | OUTPATIENT
Start: 2018-08-16 | End: 2018-08-16

## 2018-08-15 RX ADMIN — TAMSULOSIN HYDROCHLORIDE 0.8 MG: 0.4 CAPSULE ORAL at 21:50

## 2018-08-15 RX ADMIN — TRAZODONE HYDROCHLORIDE 50 MG: 50 TABLET, FILM COATED ORAL at 21:50

## 2018-08-15 NOTE — PROGRESS NOTES
Pt did not wish to come into the IOP room today.  This writer met with wife and pt.  Wife reporting pt up most of the night with little sleep,  Has poor concentration and distorted thinking.  Ruminating on behind on paperwork and thinking he has done something wrong.  Wife tried to clarify some of these points of reasoning but pt unable to reason.  Denies SI/HI.   I spoke with Dr. New and recommend pt go inpatient for severe depression.

## 2018-08-16 ENCOUNTER — APPOINTMENT (OUTPATIENT)
Dept: PSYCHIATRY | Facility: HOSPITAL | Age: 63
End: 2018-08-16

## 2018-08-16 PROCEDURE — 87086 URINE CULTURE/COLONY COUNT: CPT | Performed by: INTERNAL MEDICINE

## 2018-08-16 PROCEDURE — 81001 URINALYSIS AUTO W/SCOPE: CPT | Performed by: INTERNAL MEDICINE

## 2018-08-16 PROCEDURE — 87186 SC STD MICRODIL/AGAR DIL: CPT | Performed by: INTERNAL MEDICINE

## 2018-08-16 RX ORDER — PAROXETINE HYDROCHLORIDE 20 MG/1
20 TABLET, FILM COATED ORAL EVERY MORNING
Status: DISCONTINUED | OUTPATIENT
Start: 2018-08-17 | End: 2018-08-27 | Stop reason: HOSPADM

## 2018-08-16 RX ORDER — DULOXETIN HYDROCHLORIDE 30 MG/1
30 CAPSULE, DELAYED RELEASE ORAL DAILY
Status: DISCONTINUED | OUTPATIENT
Start: 2018-08-16 | End: 2018-08-18

## 2018-08-16 RX ADMIN — DULOXETINE HYDROCHLORIDE 30 MG: 30 CAPSULE, DELAYED RELEASE ORAL at 11:06

## 2018-08-16 RX ADMIN — PANTOPRAZOLE SODIUM 40 MG: 40 TABLET, DELAYED RELEASE ORAL at 09:16

## 2018-08-16 RX ADMIN — ARIPIPRAZOLE 5 MG: 5 TABLET ORAL at 09:16

## 2018-08-16 RX ADMIN — FAMOTIDINE 40 MG: 40 TABLET, FILM COATED ORAL at 09:16

## 2018-08-16 RX ADMIN — TAMSULOSIN HYDROCHLORIDE 0.8 MG: 0.4 CAPSULE ORAL at 21:15

## 2018-08-17 ENCOUNTER — APPOINTMENT (OUTPATIENT)
Dept: PSYCHIATRY | Facility: HOSPITAL | Age: 63
End: 2018-08-17

## 2018-08-17 PROBLEM — R82.90 ABNORMAL URINALYSIS: Status: ACTIVE | Noted: 2018-08-17

## 2018-08-17 PROCEDURE — 85027 COMPLETE CBC AUTOMATED: CPT | Performed by: INTERNAL MEDICINE

## 2018-08-17 RX ORDER — TRAZODONE HYDROCHLORIDE 50 MG/1
100 TABLET ORAL NIGHTLY PRN
Status: DISCONTINUED | OUTPATIENT
Start: 2018-08-17 | End: 2018-08-27 | Stop reason: HOSPADM

## 2018-08-17 RX ADMIN — TAMSULOSIN HYDROCHLORIDE 0.8 MG: 0.4 CAPSULE ORAL at 22:20

## 2018-08-17 RX ADMIN — TRAZODONE HYDROCHLORIDE 100 MG: 50 TABLET, FILM COATED ORAL at 22:21

## 2018-08-17 RX ADMIN — FAMOTIDINE 40 MG: 40 TABLET, FILM COATED ORAL at 08:31

## 2018-08-17 RX ADMIN — PANTOPRAZOLE SODIUM 40 MG: 40 TABLET, DELAYED RELEASE ORAL at 08:31

## 2018-08-17 RX ADMIN — PAROXETINE HYDROCHLORIDE 20 MG: 20 TABLET, FILM COATED ORAL at 08:31

## 2018-08-17 RX ADMIN — DULOXETINE HYDROCHLORIDE 30 MG: 30 CAPSULE, DELAYED RELEASE ORAL at 08:31

## 2018-08-17 RX ADMIN — ARIPIPRAZOLE 5 MG: 5 TABLET ORAL at 08:31

## 2018-08-18 RX ORDER — DULOXETIN HYDROCHLORIDE 60 MG/1
60 CAPSULE, DELAYED RELEASE ORAL DAILY
Status: DISCONTINUED | OUTPATIENT
Start: 2018-08-19 | End: 2018-08-27 | Stop reason: HOSPADM

## 2018-08-18 RX ADMIN — TAMSULOSIN HYDROCHLORIDE 0.8 MG: 0.4 CAPSULE ORAL at 21:18

## 2018-08-18 RX ADMIN — FAMOTIDINE 40 MG: 40 TABLET, FILM COATED ORAL at 09:06

## 2018-08-18 RX ADMIN — TRAZODONE HYDROCHLORIDE 100 MG: 50 TABLET, FILM COATED ORAL at 21:18

## 2018-08-18 RX ADMIN — PAROXETINE HYDROCHLORIDE 20 MG: 20 TABLET, FILM COATED ORAL at 09:02

## 2018-08-18 RX ADMIN — PANTOPRAZOLE SODIUM 40 MG: 40 TABLET, DELAYED RELEASE ORAL at 09:02

## 2018-08-18 RX ADMIN — DULOXETINE HYDROCHLORIDE 30 MG: 30 CAPSULE, DELAYED RELEASE ORAL at 09:02

## 2018-08-18 RX ADMIN — ARIPIPRAZOLE 5 MG: 5 TABLET ORAL at 09:02

## 2018-08-19 RX ORDER — LORAZEPAM 0.5 MG/1
0.5 TABLET ORAL EVERY 6 HOURS PRN
Status: DISCONTINUED | OUTPATIENT
Start: 2018-08-19 | End: 2018-08-27 | Stop reason: HOSPADM

## 2018-08-19 RX ADMIN — DULOXETINE HYDROCHLORIDE 60 MG: 60 CAPSULE, DELAYED RELEASE ORAL at 08:27

## 2018-08-19 RX ADMIN — LORAZEPAM 0.5 MG: 0.5 TABLET ORAL at 15:56

## 2018-08-19 RX ADMIN — TAMSULOSIN HYDROCHLORIDE 0.8 MG: 0.4 CAPSULE ORAL at 21:55

## 2018-08-19 RX ADMIN — TRAZODONE HYDROCHLORIDE 100 MG: 50 TABLET, FILM COATED ORAL at 21:55

## 2018-08-19 RX ADMIN — PANTOPRAZOLE SODIUM 40 MG: 40 TABLET, DELAYED RELEASE ORAL at 08:27

## 2018-08-19 RX ADMIN — ARIPIPRAZOLE 5 MG: 5 TABLET ORAL at 08:27

## 2018-08-19 RX ADMIN — PAROXETINE HYDROCHLORIDE 20 MG: 20 TABLET, FILM COATED ORAL at 08:27

## 2018-08-19 RX ADMIN — FAMOTIDINE 40 MG: 40 TABLET, FILM COATED ORAL at 08:27

## 2018-08-20 ENCOUNTER — APPOINTMENT (OUTPATIENT)
Dept: PSYCHIATRY | Facility: HOSPITAL | Age: 63
End: 2018-08-20

## 2018-08-20 RX ADMIN — ARIPIPRAZOLE 5 MG: 5 TABLET ORAL at 08:14

## 2018-08-20 RX ADMIN — LORAZEPAM 0.5 MG: 0.5 TABLET ORAL at 08:14

## 2018-08-20 RX ADMIN — PANTOPRAZOLE SODIUM 40 MG: 40 TABLET, DELAYED RELEASE ORAL at 08:13

## 2018-08-20 RX ADMIN — DULOXETINE HYDROCHLORIDE 60 MG: 60 CAPSULE, DELAYED RELEASE ORAL at 08:14

## 2018-08-20 RX ADMIN — PAROXETINE HYDROCHLORIDE 20 MG: 20 TABLET, FILM COATED ORAL at 08:13

## 2018-08-20 RX ADMIN — TAMSULOSIN HYDROCHLORIDE 0.8 MG: 0.4 CAPSULE ORAL at 21:08

## 2018-08-20 RX ADMIN — FAMOTIDINE 40 MG: 40 TABLET, FILM COATED ORAL at 08:14

## 2018-08-20 RX ADMIN — ACETAMINOPHEN 650 MG: 325 TABLET, FILM COATED ORAL at 08:14

## 2018-08-21 ENCOUNTER — TRANSCRIBE ORDERS (OUTPATIENT)
Dept: PERIOP | Facility: HOSPITAL | Age: 63
End: 2018-08-21

## 2018-08-21 ENCOUNTER — APPOINTMENT (OUTPATIENT)
Dept: PSYCHIATRY | Facility: HOSPITAL | Age: 63
End: 2018-08-21

## 2018-08-21 DIAGNOSIS — F32.A DEPRESSION: Primary | ICD-10-CM

## 2018-08-21 PROBLEM — N30.01 ACUTE CYSTITIS WITH HEMATURIA: Status: ACTIVE | Noted: 2018-08-17

## 2018-08-21 PROBLEM — D72.829 LEUKOCYTOSIS: Status: ACTIVE | Noted: 2018-08-21

## 2018-08-21 PROCEDURE — 85025 COMPLETE CBC W/AUTO DIFF WBC: CPT | Performed by: INTERNAL MEDICINE

## 2018-08-21 PROCEDURE — 93005 ELECTROCARDIOGRAM TRACING: CPT | Performed by: INTERNAL MEDICINE

## 2018-08-21 PROCEDURE — 93010 ELECTROCARDIOGRAM REPORT: CPT | Performed by: INTERNAL MEDICINE

## 2018-08-21 PROCEDURE — 80048 BASIC METABOLIC PNL TOTAL CA: CPT | Performed by: INTERNAL MEDICINE

## 2018-08-21 RX ORDER — CEPHALEXIN 500 MG/1
500 CAPSULE ORAL 3 TIMES DAILY
Status: DISCONTINUED | OUTPATIENT
Start: 2018-08-21 | End: 2018-08-27 | Stop reason: HOSPADM

## 2018-08-21 RX ADMIN — ARIPIPRAZOLE 5 MG: 5 TABLET ORAL at 08:34

## 2018-08-21 RX ADMIN — TAMSULOSIN HYDROCHLORIDE 0.8 MG: 0.4 CAPSULE ORAL at 20:35

## 2018-08-21 RX ADMIN — CEPHALEXIN 500 MG: 500 CAPSULE ORAL at 15:32

## 2018-08-21 RX ADMIN — PANTOPRAZOLE SODIUM 40 MG: 40 TABLET, DELAYED RELEASE ORAL at 08:34

## 2018-08-21 RX ADMIN — FAMOTIDINE 40 MG: 40 TABLET, FILM COATED ORAL at 08:34

## 2018-08-21 RX ADMIN — DULOXETINE HYDROCHLORIDE 60 MG: 60 CAPSULE, DELAYED RELEASE ORAL at 08:34

## 2018-08-21 RX ADMIN — PAROXETINE HYDROCHLORIDE 20 MG: 20 TABLET, FILM COATED ORAL at 08:34

## 2018-08-21 RX ADMIN — CEPHALEXIN 500 MG: 500 CAPSULE ORAL at 20:35

## 2018-08-21 RX ADMIN — TRAZODONE HYDROCHLORIDE 100 MG: 50 TABLET, FILM COATED ORAL at 20:35

## 2018-08-22 ENCOUNTER — APPOINTMENT (OUTPATIENT)
Dept: PSYCHIATRY | Facility: HOSPITAL | Age: 63
End: 2018-08-22

## 2018-08-22 ENCOUNTER — ANESTHESIA (OUTPATIENT)
Dept: POSTOP/PACU | Facility: HOSPITAL | Age: 63
End: 2018-08-22

## 2018-08-22 ENCOUNTER — ANESTHESIA EVENT (OUTPATIENT)
Dept: POSTOP/PACU | Facility: HOSPITAL | Age: 63
End: 2018-08-22

## 2018-08-22 ENCOUNTER — HOSPITAL ENCOUNTER (OUTPATIENT)
Dept: POSTOP/PACU | Facility: HOSPITAL | Age: 63
Discharge: HOME | End: 2018-08-22
Attending: SPECIALIST | Admitting: SPECIALIST

## 2018-08-22 VITALS
HEART RATE: 79 BPM | OXYGEN SATURATION: 94 % | TEMPERATURE: 98.4 F | BODY MASS INDEX: 30.8 KG/M2 | DIASTOLIC BLOOD PRESSURE: 76 MMHG | WEIGHT: 220 LBS | HEIGHT: 71 IN | RESPIRATION RATE: 18 BRPM | SYSTOLIC BLOOD PRESSURE: 121 MMHG

## 2018-08-22 VITALS — OXYGEN SATURATION: 90 % | DIASTOLIC BLOOD PRESSURE: 70 MMHG | SYSTOLIC BLOOD PRESSURE: 106 MMHG

## 2018-08-22 DIAGNOSIS — F32.A DEPRESSION: ICD-10-CM

## 2018-08-22 PROCEDURE — 25010000002 PROPOFOL 10 MG/ML EMULSION: Performed by: NURSE ANESTHETIST, CERTIFIED REGISTERED

## 2018-08-22 PROCEDURE — GZB4ZZZ OTHER ELECTROCONVULSIVE THERAPY: ICD-10-PCS | Performed by: SPECIALIST

## 2018-08-22 PROCEDURE — 90870 ELECTROCONVULSIVE THERAPY: CPT

## 2018-08-22 PROCEDURE — 25010000002 SUCCINYLCHOLINE PER 20 MG: Performed by: NURSE ANESTHETIST, CERTIFIED REGISTERED

## 2018-08-22 RX ORDER — PROPOFOL 10 MG/ML
VIAL (ML) INTRAVENOUS AS NEEDED
Status: DISCONTINUED | OUTPATIENT
Start: 2018-08-22 | End: 2018-08-22 | Stop reason: SURG

## 2018-08-22 RX ORDER — SODIUM CHLORIDE, SODIUM LACTATE, POTASSIUM CHLORIDE, CALCIUM CHLORIDE 600; 310; 30; 20 MG/100ML; MG/100ML; MG/100ML; MG/100ML
INJECTION, SOLUTION INTRAVENOUS CONTINUOUS PRN
Status: DISCONTINUED | OUTPATIENT
Start: 2018-08-22 | End: 2018-08-22 | Stop reason: SURG

## 2018-08-22 RX ORDER — GLYCOPYRROLATE 0.2 MG/ML
INJECTION INTRAMUSCULAR; INTRAVENOUS AS NEEDED
Status: DISCONTINUED | OUTPATIENT
Start: 2018-08-22 | End: 2018-08-22 | Stop reason: SURG

## 2018-08-22 RX ORDER — SUCCINYLCHOLINE CHLORIDE 20 MG/ML
INJECTION INTRAMUSCULAR; INTRAVENOUS AS NEEDED
Status: DISCONTINUED | OUTPATIENT
Start: 2018-08-22 | End: 2018-08-22 | Stop reason: SURG

## 2018-08-22 RX ADMIN — PROPOFOL 140 MG: 10 INJECTION, EMULSION INTRAVENOUS at 08:26

## 2018-08-22 RX ADMIN — SUCCINYLCHOLINE CHLORIDE 80 MG: 20 INJECTION, SOLUTION INTRAMUSCULAR; INTRAVENOUS; PARENTERAL at 08:28

## 2018-08-22 RX ADMIN — DULOXETINE HYDROCHLORIDE 60 MG: 60 CAPSULE, DELAYED RELEASE ORAL at 11:10

## 2018-08-22 RX ADMIN — CEPHALEXIN 500 MG: 500 CAPSULE ORAL at 17:50

## 2018-08-22 RX ADMIN — ARIPIPRAZOLE 5 MG: 5 TABLET ORAL at 11:10

## 2018-08-22 RX ADMIN — TAMSULOSIN HYDROCHLORIDE 0.8 MG: 0.4 CAPSULE ORAL at 21:42

## 2018-08-22 RX ADMIN — CEPHALEXIN 500 MG: 500 CAPSULE ORAL at 11:10

## 2018-08-22 RX ADMIN — FAMOTIDINE 40 MG: 40 TABLET, FILM COATED ORAL at 11:10

## 2018-08-22 RX ADMIN — SODIUM CHLORIDE, POTASSIUM CHLORIDE, SODIUM LACTATE AND CALCIUM CHLORIDE: 600; 310; 30; 20 INJECTION, SOLUTION INTRAVENOUS at 08:17

## 2018-08-22 RX ADMIN — CEPHALEXIN 500 MG: 500 CAPSULE ORAL at 21:42

## 2018-08-22 RX ADMIN — ACETAMINOPHEN 650 MG: 325 TABLET, FILM COATED ORAL at 11:09

## 2018-08-22 RX ADMIN — PANTOPRAZOLE SODIUM 40 MG: 40 TABLET, DELAYED RELEASE ORAL at 11:10

## 2018-08-22 RX ADMIN — GLYCOPYRROLATE 0.2 MCG: 0.2 INJECTION INTRAMUSCULAR; INTRAVENOUS at 08:26

## 2018-08-22 RX ADMIN — PAROXETINE HYDROCHLORIDE 20 MG: 20 TABLET, FILM COATED ORAL at 11:10

## 2018-08-22 NOTE — ANESTHESIA PREPROCEDURE EVALUATION
Anesthesia Evaluation     Patient summary reviewed and Nursing notes reviewed   NPO Solid Status: > 8 hours  NPO Liquid Status: > 2 hours           Airway   Mallampati: II  TM distance: >3 FB  Neck ROM: full  no difficulty expected  Dental - normal exam     Pulmonary - negative pulmonary ROS and normal exam    breath sounds clear to auscultation  (-) decreased breath sounds, wheezes  Cardiovascular - normal exam  Exercise tolerance: good (4-7 METS)    Rhythm: regular  Rate: normal    (-) hypertension      Neuro/Psych  (+) psychiatric history Depression,     (-) seizures, CVA  GI/Hepatic/Renal/Endo    (+)  GERD,    (-) diabetes    Musculoskeletal (-) negative ROS    Abdominal  - normal exam   Substance History - negative use  (-) alcohol use, drug use     OB/GYN negative ob/gyn ROS         Other - negative ROS                       Anesthesia Plan    ASA 3     general     intravenous induction   Anesthetic plan and risks discussed with patient.    Plan discussed with CRNA.

## 2018-08-22 NOTE — ANESTHESIA POSTPROCEDURE EVALUATION
Patient: Chilo Hurt    Procedure Summary     Date:  08/22/18 Room / Location:  [facility] PACU    Anesthesia Start:  0825 Anesthesia Stop:  0843    Procedure:  ELECTROCONVULSIVE THERAPY Diagnosis:  Depression    Scheduled Providers:   Provider:  Daswon Billy MD    Anesthesia Type:  general ASA Status:  3          Anesthesia Type: general  Last vitals  BP   121/76 (08/22/18 0745)   Temp   36.9 °C (98.4 °F) (08/22/18 0745)   Pulse   79 (08/22/18 0745)   Resp   18 (08/22/18 0745)     SpO2   94 % (08/22/18 0745)     Post Anesthesia Care and Evaluation    Patient participation: complete - patient participated  Level of consciousness: awake  Pain management: adequate  Airway patency: patent  Anesthetic complications: No anesthetic complications    Cardiovascular status: acceptable  Respiratory status: acceptable  Hydration status: acceptable

## 2018-08-23 ENCOUNTER — APPOINTMENT (OUTPATIENT)
Dept: PSYCHIATRY | Facility: HOSPITAL | Age: 63
End: 2018-08-23

## 2018-08-23 RX ADMIN — CEPHALEXIN 500 MG: 500 CAPSULE ORAL at 16:50

## 2018-08-23 RX ADMIN — CEPHALEXIN 500 MG: 500 CAPSULE ORAL at 21:58

## 2018-08-23 RX ADMIN — ARIPIPRAZOLE 5 MG: 5 TABLET ORAL at 09:17

## 2018-08-23 RX ADMIN — CEPHALEXIN 500 MG: 500 CAPSULE ORAL at 09:17

## 2018-08-23 RX ADMIN — PAROXETINE HYDROCHLORIDE 20 MG: 20 TABLET, FILM COATED ORAL at 09:17

## 2018-08-23 RX ADMIN — DULOXETINE HYDROCHLORIDE 60 MG: 60 CAPSULE, DELAYED RELEASE ORAL at 09:17

## 2018-08-23 RX ADMIN — FAMOTIDINE 40 MG: 40 TABLET, FILM COATED ORAL at 09:17

## 2018-08-23 RX ADMIN — PANTOPRAZOLE SODIUM 40 MG: 40 TABLET, DELAYED RELEASE ORAL at 09:18

## 2018-08-23 RX ADMIN — TAMSULOSIN HYDROCHLORIDE 0.8 MG: 0.4 CAPSULE ORAL at 21:57

## 2018-08-24 ENCOUNTER — APPOINTMENT (OUTPATIENT)
Dept: PSYCHIATRY | Facility: HOSPITAL | Age: 63
End: 2018-08-24

## 2018-08-24 ENCOUNTER — ANESTHESIA (OUTPATIENT)
Dept: POSTOP/PACU | Facility: HOSPITAL | Age: 63
End: 2018-08-24

## 2018-08-24 ENCOUNTER — ANESTHESIA EVENT (OUTPATIENT)
Dept: POSTOP/PACU | Facility: HOSPITAL | Age: 63
End: 2018-08-24

## 2018-08-24 ENCOUNTER — HOSPITAL ENCOUNTER (OUTPATIENT)
Dept: POSTOP/PACU | Facility: HOSPITAL | Age: 63
Discharge: HOME | End: 2018-08-24
Attending: SPECIALIST | Admitting: SPECIALIST

## 2018-08-24 VITALS
DIASTOLIC BLOOD PRESSURE: 86 MMHG | HEIGHT: 71 IN | WEIGHT: 220 LBS | RESPIRATION RATE: 18 BRPM | OXYGEN SATURATION: 94 % | BODY MASS INDEX: 30.8 KG/M2 | TEMPERATURE: 98.4 F | HEART RATE: 77 BPM | SYSTOLIC BLOOD PRESSURE: 118 MMHG

## 2018-08-24 VITALS — DIASTOLIC BLOOD PRESSURE: 87 MMHG | OXYGEN SATURATION: 92 % | SYSTOLIC BLOOD PRESSURE: 183 MMHG

## 2018-08-24 DIAGNOSIS — F32.A DEPRESSION: ICD-10-CM

## 2018-08-24 PROCEDURE — 25010000002 SUCCINYLCHOLINE PER 20 MG: Performed by: NURSE ANESTHETIST, CERTIFIED REGISTERED

## 2018-08-24 PROCEDURE — 25010000002 PROPOFOL 10 MG/ML EMULSION: Performed by: NURSE ANESTHETIST, CERTIFIED REGISTERED

## 2018-08-24 PROCEDURE — GZB4ZZZ OTHER ELECTROCONVULSIVE THERAPY: ICD-10-PCS | Performed by: SPECIALIST

## 2018-08-24 PROCEDURE — 90870 ELECTROCONVULSIVE THERAPY: CPT

## 2018-08-24 RX ORDER — SUCCINYLCHOLINE CHLORIDE 20 MG/ML
INJECTION INTRAMUSCULAR; INTRAVENOUS AS NEEDED
Status: DISCONTINUED | OUTPATIENT
Start: 2018-08-24 | End: 2018-08-24 | Stop reason: SURG

## 2018-08-24 RX ORDER — PROPOFOL 10 MG/ML
VIAL (ML) INTRAVENOUS AS NEEDED
Status: DISCONTINUED | OUTPATIENT
Start: 2018-08-24 | End: 2018-08-24 | Stop reason: SURG

## 2018-08-24 RX ORDER — GLYCOPYRROLATE 0.2 MG/ML
INJECTION INTRAMUSCULAR; INTRAVENOUS AS NEEDED
Status: DISCONTINUED | OUTPATIENT
Start: 2018-08-24 | End: 2018-08-24 | Stop reason: SURG

## 2018-08-24 RX ORDER — SODIUM CHLORIDE, SODIUM LACTATE, POTASSIUM CHLORIDE, CALCIUM CHLORIDE 600; 310; 30; 20 MG/100ML; MG/100ML; MG/100ML; MG/100ML
INJECTION, SOLUTION INTRAVENOUS CONTINUOUS PRN
Status: DISCONTINUED | OUTPATIENT
Start: 2018-08-24 | End: 2018-08-24 | Stop reason: SURG

## 2018-08-24 RX ADMIN — SODIUM CHLORIDE, POTASSIUM CHLORIDE, SODIUM LACTATE AND CALCIUM CHLORIDE: 600; 310; 30; 20 INJECTION, SOLUTION INTRAVENOUS at 08:13

## 2018-08-24 RX ADMIN — TRAZODONE HYDROCHLORIDE 100 MG: 50 TABLET, FILM COATED ORAL at 21:50

## 2018-08-24 RX ADMIN — GLYCOPYRROLATE 0.2 MCG: 0.2 INJECTION INTRAMUSCULAR; INTRAVENOUS at 08:17

## 2018-08-24 RX ADMIN — PROPOFOL 150 MG: 10 INJECTION, EMULSION INTRAVENOUS at 08:17

## 2018-08-24 RX ADMIN — FAMOTIDINE 40 MG: 40 TABLET, FILM COATED ORAL at 11:53

## 2018-08-24 RX ADMIN — CEPHALEXIN 500 MG: 500 CAPSULE ORAL at 11:53

## 2018-08-24 RX ADMIN — DULOXETINE HYDROCHLORIDE 60 MG: 60 CAPSULE, DELAYED RELEASE ORAL at 11:53

## 2018-08-24 RX ADMIN — LORAZEPAM 0.5 MG: 0.5 TABLET ORAL at 21:50

## 2018-08-24 RX ADMIN — SUCCINYLCHOLINE CHLORIDE 80 MG: 20 INJECTION, SOLUTION INTRAMUSCULAR; INTRAVENOUS; PARENTERAL at 08:17

## 2018-08-24 RX ADMIN — CEPHALEXIN 500 MG: 500 CAPSULE ORAL at 21:50

## 2018-08-24 RX ADMIN — TAMSULOSIN HYDROCHLORIDE 0.8 MG: 0.4 CAPSULE ORAL at 21:50

## 2018-08-24 RX ADMIN — PAROXETINE HYDROCHLORIDE 20 MG: 20 TABLET, FILM COATED ORAL at 11:52

## 2018-08-24 RX ADMIN — ARIPIPRAZOLE 5 MG: 5 TABLET ORAL at 11:53

## 2018-08-24 RX ADMIN — CEPHALEXIN 500 MG: 500 CAPSULE ORAL at 17:28

## 2018-08-24 NOTE — ANESTHESIA POSTPROCEDURE EVALUATION
Patient: Chilo Hurt    Procedure Summary     Date:  08/24/18 Room / Location:  [facility] PACU    Anesthesia Start:  0813 Anesthesia Stop:  0827    Procedure:  ELECTROCONVULSIVE THERAPY Diagnosis:  Depression    Scheduled Providers:   Provider:  Marilou Cantrell MD    Anesthesia Type:  general, MAC ASA Status:  2          Anesthesia Type: general, MAC  Last vitals  BP   118/86 (08/24/18 0740)   Temp   36.9 °C (98.4 °F) (08/24/18 0740)   Pulse   77 (08/24/18 0740)   Resp   18 (08/24/18 0740)     SpO2   94 % (08/24/18 0740)     Post Anesthesia Care and Evaluation    Patient location during evaluation: PACU  Patient participation: complete - patient participated  Level of consciousness: awake and alert  Pain management: adequate  Airway patency: patent  Anesthetic complications: No anesthetic complications    Cardiovascular status: acceptable  Respiratory status: acceptable  Hydration status: acceptable

## 2018-08-24 NOTE — ANESTHESIA PREPROCEDURE EVALUATION
Anesthesia Evaluation     Patient summary reviewed and Nursing notes reviewed   NPO Solid Status: > 8 hours  NPO Liquid Status: > 8 hours           Airway   Mallampati: II  TM distance: >3 FB  Neck ROM: full  no difficulty expected  Dental - normal exam     Pulmonary - negative pulmonary ROS and normal exam   Cardiovascular - negative cardio ROS and normal exam  Exercise tolerance: good (4-7 METS)    ECG reviewed  Rhythm: regular  Rate: normal        Neuro/Psych  (+) psychiatric history Depression,     GI/Hepatic/Renal/Endo    (+)  GERD,      Musculoskeletal (-) negative ROS    Abdominal  - normal exam   Substance History - negative use     OB/GYN          Other - negative ROS                       Anesthesia Plan    ASA 2     general and MAC     intravenous induction   Anesthetic plan and risks discussed with patient.

## 2018-08-25 RX ADMIN — CEPHALEXIN 500 MG: 500 CAPSULE ORAL at 10:15

## 2018-08-25 RX ADMIN — DULOXETINE HYDROCHLORIDE 60 MG: 60 CAPSULE, DELAYED RELEASE ORAL at 10:16

## 2018-08-25 RX ADMIN — ARIPIPRAZOLE 5 MG: 5 TABLET ORAL at 10:17

## 2018-08-25 RX ADMIN — CEPHALEXIN 500 MG: 500 CAPSULE ORAL at 17:14

## 2018-08-25 RX ADMIN — CEPHALEXIN 500 MG: 500 CAPSULE ORAL at 21:19

## 2018-08-25 RX ADMIN — PAROXETINE HYDROCHLORIDE 20 MG: 20 TABLET, FILM COATED ORAL at 10:16

## 2018-08-25 RX ADMIN — FAMOTIDINE 40 MG: 40 TABLET, FILM COATED ORAL at 10:15

## 2018-08-25 RX ADMIN — TAMSULOSIN HYDROCHLORIDE 0.8 MG: 0.4 CAPSULE ORAL at 21:19

## 2018-08-25 RX ADMIN — PANTOPRAZOLE SODIUM 40 MG: 40 TABLET, DELAYED RELEASE ORAL at 10:17

## 2018-08-26 RX ADMIN — CEPHALEXIN 500 MG: 500 CAPSULE ORAL at 16:56

## 2018-08-26 RX ADMIN — PANTOPRAZOLE SODIUM 40 MG: 40 TABLET, DELAYED RELEASE ORAL at 09:45

## 2018-08-26 RX ADMIN — CEPHALEXIN 500 MG: 500 CAPSULE ORAL at 09:45

## 2018-08-26 RX ADMIN — DULOXETINE HYDROCHLORIDE 60 MG: 60 CAPSULE, DELAYED RELEASE ORAL at 09:45

## 2018-08-26 RX ADMIN — PAROXETINE HYDROCHLORIDE 20 MG: 20 TABLET, FILM COATED ORAL at 09:45

## 2018-08-26 RX ADMIN — CEPHALEXIN 500 MG: 500 CAPSULE ORAL at 21:11

## 2018-08-26 RX ADMIN — TAMSULOSIN HYDROCHLORIDE 0.8 MG: 0.4 CAPSULE ORAL at 21:11

## 2018-08-26 RX ADMIN — ARIPIPRAZOLE 5 MG: 5 TABLET ORAL at 09:45

## 2018-08-26 RX ADMIN — FAMOTIDINE 40 MG: 40 TABLET, FILM COATED ORAL at 09:45

## 2018-08-27 ENCOUNTER — TRANSCRIBE ORDERS (OUTPATIENT)
Dept: PERIOP | Facility: HOSPITAL | Age: 63
End: 2018-08-27

## 2018-08-27 ENCOUNTER — ANESTHESIA (OUTPATIENT)
Dept: POSTOP/PACU | Facility: HOSPITAL | Age: 63
End: 2018-08-27

## 2018-08-27 ENCOUNTER — ANESTHESIA EVENT (OUTPATIENT)
Dept: POSTOP/PACU | Facility: HOSPITAL | Age: 63
End: 2018-08-27

## 2018-08-27 ENCOUNTER — HOSPITAL ENCOUNTER (OUTPATIENT)
Dept: POSTOP/PACU | Facility: HOSPITAL | Age: 63
Discharge: HOME | End: 2018-08-27
Attending: SPECIALIST | Admitting: SPECIALIST

## 2018-08-27 ENCOUNTER — APPOINTMENT (OUTPATIENT)
Dept: PSYCHIATRY | Facility: HOSPITAL | Age: 63
End: 2018-08-27

## 2018-08-27 VITALS — SYSTOLIC BLOOD PRESSURE: 105 MMHG | DIASTOLIC BLOOD PRESSURE: 77 MMHG | OXYGEN SATURATION: 91 %

## 2018-08-27 VITALS
HEART RATE: 75 BPM | SYSTOLIC BLOOD PRESSURE: 120 MMHG | TEMPERATURE: 98.4 F | BODY MASS INDEX: 30.8 KG/M2 | WEIGHT: 220 LBS | RESPIRATION RATE: 18 BRPM | DIASTOLIC BLOOD PRESSURE: 85 MMHG | HEIGHT: 71 IN | OXYGEN SATURATION: 93 %

## 2018-08-27 VITALS
OXYGEN SATURATION: 95 % | DIASTOLIC BLOOD PRESSURE: 86 MMHG | TEMPERATURE: 97.8 F | HEIGHT: 71 IN | SYSTOLIC BLOOD PRESSURE: 129 MMHG | RESPIRATION RATE: 16 BRPM | HEART RATE: 113 BPM

## 2018-08-27 DIAGNOSIS — F32.A DEPRESSION: Primary | ICD-10-CM

## 2018-08-27 DIAGNOSIS — F32.A DEPRESSION: ICD-10-CM

## 2018-08-27 PROCEDURE — 90870 ELECTROCONVULSIVE THERAPY: CPT

## 2018-08-27 PROCEDURE — 25010000002 PROPOFOL 10 MG/ML EMULSION: Performed by: NURSE ANESTHETIST, CERTIFIED REGISTERED

## 2018-08-27 PROCEDURE — 25010000002 SUCCINYLCHOLINE PER 20 MG: Performed by: NURSE ANESTHETIST, CERTIFIED REGISTERED

## 2018-08-27 PROCEDURE — GZB4ZZZ OTHER ELECTROCONVULSIVE THERAPY: ICD-10-PCS | Performed by: SPECIALIST

## 2018-08-27 RX ORDER — CEPHALEXIN 500 MG/1
500 CAPSULE ORAL 3 TIMES DAILY
Qty: 25 CAPSULE | Refills: 0 | Status: SHIPPED | OUTPATIENT
Start: 2018-08-27 | End: 2018-09-05

## 2018-08-27 RX ORDER — LORAZEPAM 0.5 MG/1
0.5 TABLET ORAL EVERY 6 HOURS PRN
Qty: 75 TABLET | Refills: 1 | Status: SHIPPED | OUTPATIENT
Start: 2018-08-27 | End: 2018-08-29

## 2018-08-27 RX ORDER — PROPOFOL 10 MG/ML
VIAL (ML) INTRAVENOUS AS NEEDED
Status: DISCONTINUED | OUTPATIENT
Start: 2018-08-27 | End: 2018-08-27 | Stop reason: SURG

## 2018-08-27 RX ORDER — DULOXETIN HYDROCHLORIDE 60 MG/1
60 CAPSULE, DELAYED RELEASE ORAL DAILY
Qty: 30 CAPSULE | Refills: 1 | Status: SHIPPED | OUTPATIENT
Start: 2018-08-27

## 2018-08-27 RX ORDER — GLYCOPYRROLATE 0.2 MG/ML
INJECTION INTRAMUSCULAR; INTRAVENOUS AS NEEDED
Status: DISCONTINUED | OUTPATIENT
Start: 2018-08-27 | End: 2018-08-27 | Stop reason: SURG

## 2018-08-27 RX ORDER — ARIPIPRAZOLE 5 MG/1
5 TABLET ORAL DAILY
Qty: 30 TABLET | Refills: 1 | Status: SHIPPED | OUTPATIENT
Start: 2018-08-27

## 2018-08-27 RX ORDER — SODIUM CHLORIDE, SODIUM LACTATE, POTASSIUM CHLORIDE, CALCIUM CHLORIDE 600; 310; 30; 20 MG/100ML; MG/100ML; MG/100ML; MG/100ML
INJECTION, SOLUTION INTRAVENOUS CONTINUOUS PRN
Status: DISCONTINUED | OUTPATIENT
Start: 2018-08-27 | End: 2018-08-27 | Stop reason: SURG

## 2018-08-27 RX ORDER — ATROPINE SULFATE 0.4 MG/ML
AMPUL (ML) INJECTION AS NEEDED
Status: DISCONTINUED | OUTPATIENT
Start: 2018-08-27 | End: 2018-08-27 | Stop reason: SURG

## 2018-08-27 RX ORDER — SUCCINYLCHOLINE CHLORIDE 20 MG/ML
INJECTION INTRAMUSCULAR; INTRAVENOUS AS NEEDED
Status: DISCONTINUED | OUTPATIENT
Start: 2018-08-27 | End: 2018-08-27 | Stop reason: SURG

## 2018-08-27 RX ORDER — TRAZODONE HYDROCHLORIDE 100 MG/1
100 TABLET ORAL NIGHTLY PRN
Qty: 30 TABLET | Refills: 1 | Status: SHIPPED | OUTPATIENT
Start: 2018-08-27 | End: 2021-09-13

## 2018-08-27 RX ADMIN — DULOXETINE HYDROCHLORIDE 60 MG: 60 CAPSULE, DELAYED RELEASE ORAL at 10:53

## 2018-08-27 RX ADMIN — FAMOTIDINE 40 MG: 40 TABLET, FILM COATED ORAL at 10:53

## 2018-08-27 RX ADMIN — PAROXETINE HYDROCHLORIDE 20 MG: 20 TABLET, FILM COATED ORAL at 10:53

## 2018-08-27 RX ADMIN — SUCCINYLCHOLINE CHLORIDE 80 MG: 20 INJECTION, SOLUTION INTRAMUSCULAR; INTRAVENOUS; PARENTERAL at 08:52

## 2018-08-27 RX ADMIN — PANTOPRAZOLE SODIUM 40 MG: 40 TABLET, DELAYED RELEASE ORAL at 10:53

## 2018-08-27 RX ADMIN — CEPHALEXIN 500 MG: 500 CAPSULE ORAL at 10:53

## 2018-08-27 RX ADMIN — ARIPIPRAZOLE 5 MG: 5 TABLET ORAL at 10:53

## 2018-08-27 RX ADMIN — GLYCOPYRROLATE 0.2 MCG: 0.2 INJECTION INTRAMUSCULAR; INTRAVENOUS at 08:52

## 2018-08-27 RX ADMIN — PROPOFOL 100 MG: 10 INJECTION, EMULSION INTRAVENOUS at 08:52

## 2018-08-27 RX ADMIN — ATROPINE SULFATE 0.4 MG: 0.4 INJECTION, SOLUTION INTRAMUSCULAR; INTRAVENOUS; SUBCUTANEOUS at 09:00

## 2018-08-27 RX ADMIN — SODIUM CHLORIDE, POTASSIUM CHLORIDE, SODIUM LACTATE AND CALCIUM CHLORIDE: 600; 310; 30; 20 INJECTION, SOLUTION INTRAVENOUS at 08:51

## 2018-08-27 NOTE — ANESTHESIA POSTPROCEDURE EVALUATION
"Patient: Chilo Hurt    Procedure Summary     Date:  08/27/18 Room / Location:  [facility] PACU    Anesthesia Start:  0851 Anesthesia Stop:  0909    Procedure:  ELECTROCONVULSIVE THERAPY Diagnosis:  Depression    Scheduled Providers:   Provider:  Tuan Desai MD    Anesthesia Type:  MAC ASA Status:  2          Anesthesia Type: MAC  Last vitals  BP   120/85 (08/27/18 0723)   Temp   36.9 °C (98.4 °F) (08/27/18 0723)   Pulse   75 (08/27/18 0723)   Resp   18 (08/27/18 0723)     SpO2   93 % (08/27/18 0723)     Post Anesthesia Care and Evaluation    Patient location during evaluation: bedside  Patient participation: complete - patient participated  Level of consciousness: awake  Pain management: adequate  Airway patency: patent  Anesthetic complications: No anesthetic complications    Cardiovascular status: acceptable  Respiratory status: acceptable  Hydration status: acceptable    Comments: */85 (BP Location: Right arm, Patient Position: Lying)   Pulse 75   Temp 36.9 °C (98.4 °F) (Oral)   Resp 18   Ht 180.3 cm (70.98\")   Wt 99.8 kg (220 lb)   SpO2 93%   BMI 30.70 kg/m²         "

## 2018-08-27 NOTE — ANESTHESIA PREPROCEDURE EVALUATION
Anesthesia Evaluation     Patient summary reviewed and Nursing notes reviewed   NPO Solid Status: > 8 hours  NPO Liquid Status: > 2 hours           Airway   Mallampati: II  TM distance: >3 FB  Neck ROM: full  no difficulty expected  Dental - normal exam     Pulmonary - negative pulmonary ROS and normal exam   Cardiovascular - negative cardio ROS and normal exam  Exercise tolerance: good (4-7 METS)    ECG reviewed  Rhythm: regular  Rate: normal    (-) hypertension      Neuro/Psych- negative ROS  (-) seizures, CVA  GI/Hepatic/Renal/Endo - negative ROS   (-) diabetes    Musculoskeletal (-) negative ROS    Abdominal  - normal exam   Substance History - negative use  (-) alcohol use, drug use     OB/GYN negative ob/gyn ROS         Other - negative ROS                         Anesthesia Plan    ASA 2     general     intravenous induction   Anesthetic plan and risks discussed with patient.

## 2018-08-28 ENCOUNTER — ANESTHESIA EVENT (OUTPATIENT)
Dept: POSTOP/PACU | Facility: HOSPITAL | Age: 63
End: 2018-08-28

## 2018-08-28 ENCOUNTER — APPOINTMENT (OUTPATIENT)
Dept: PSYCHIATRY | Facility: HOSPITAL | Age: 63
End: 2018-08-28

## 2018-08-29 ENCOUNTER — APPOINTMENT (OUTPATIENT)
Dept: PSYCHIATRY | Facility: HOSPITAL | Age: 63
End: 2018-08-29

## 2018-08-29 ENCOUNTER — ANESTHESIA (OUTPATIENT)
Dept: POSTOP/PACU | Facility: HOSPITAL | Age: 63
End: 2018-08-29

## 2018-08-29 ENCOUNTER — HOSPITAL ENCOUNTER (OUTPATIENT)
Dept: POSTOP/PACU | Facility: HOSPITAL | Age: 63
Discharge: HOME | End: 2018-08-29
Attending: SPECIALIST | Admitting: SPECIALIST

## 2018-08-29 VITALS
TEMPERATURE: 98.5 F | WEIGHT: 220 LBS | SYSTOLIC BLOOD PRESSURE: 135 MMHG | HEIGHT: 72 IN | BODY MASS INDEX: 29.8 KG/M2 | DIASTOLIC BLOOD PRESSURE: 94 MMHG | RESPIRATION RATE: 18 BRPM | OXYGEN SATURATION: 95 % | HEART RATE: 83 BPM

## 2018-08-29 VITALS — DIASTOLIC BLOOD PRESSURE: 101 MMHG | SYSTOLIC BLOOD PRESSURE: 166 MMHG | OXYGEN SATURATION: 83 %

## 2018-08-29 DIAGNOSIS — F32.A DEPRESSION: ICD-10-CM

## 2018-08-29 PROCEDURE — 90870 ELECTROCONVULSIVE THERAPY: CPT

## 2018-08-29 PROCEDURE — 25010000002 SUCCINYLCHOLINE PER 20 MG: Performed by: ANESTHESIOLOGY

## 2018-08-29 PROCEDURE — 25010000002 PROPOFOL 10 MG/ML EMULSION: Performed by: ANESTHESIOLOGY

## 2018-08-29 RX ORDER — PROMETHAZINE HYDROCHLORIDE 25 MG/ML
12.5 INJECTION, SOLUTION INTRAMUSCULAR; INTRAVENOUS ONCE AS NEEDED
Status: DISCONTINUED | OUTPATIENT
Start: 2018-08-29 | End: 2018-08-30 | Stop reason: HOSPADM

## 2018-08-29 RX ORDER — NALOXONE HCL 0.4 MG/ML
0.2 VIAL (ML) INJECTION AS NEEDED
Status: DISCONTINUED | OUTPATIENT
Start: 2018-08-29 | End: 2018-08-30 | Stop reason: HOSPADM

## 2018-08-29 RX ORDER — SODIUM CHLORIDE, SODIUM LACTATE, POTASSIUM CHLORIDE, CALCIUM CHLORIDE 600; 310; 30; 20 MG/100ML; MG/100ML; MG/100ML; MG/100ML
INJECTION, SOLUTION INTRAVENOUS CONTINUOUS PRN
Status: DISCONTINUED | OUTPATIENT
Start: 2018-08-29 | End: 2018-08-29 | Stop reason: SURG

## 2018-08-29 RX ORDER — FENTANYL CITRATE 50 UG/ML
50 INJECTION, SOLUTION INTRAMUSCULAR; INTRAVENOUS
Status: DISCONTINUED | OUTPATIENT
Start: 2018-08-29 | End: 2018-08-30 | Stop reason: HOSPADM

## 2018-08-29 RX ORDER — PROMETHAZINE HYDROCHLORIDE 25 MG/1
12.5 TABLET ORAL ONCE AS NEEDED
Status: DISCONTINUED | OUTPATIENT
Start: 2018-08-29 | End: 2018-08-30 | Stop reason: HOSPADM

## 2018-08-29 RX ORDER — LABETALOL HYDROCHLORIDE 5 MG/ML
5 INJECTION, SOLUTION INTRAVENOUS
Status: DISCONTINUED | OUTPATIENT
Start: 2018-08-29 | End: 2018-08-30 | Stop reason: HOSPADM

## 2018-08-29 RX ORDER — HYDROCODONE BITARTRATE AND ACETAMINOPHEN 7.5; 325 MG/1; MG/1
1 TABLET ORAL ONCE AS NEEDED
Status: DISCONTINUED | OUTPATIENT
Start: 2018-08-29 | End: 2018-08-30 | Stop reason: HOSPADM

## 2018-08-29 RX ORDER — HYDROMORPHONE HYDROCHLORIDE 1 MG/ML
0.5 INJECTION, SOLUTION INTRAMUSCULAR; INTRAVENOUS; SUBCUTANEOUS
Status: DISCONTINUED | OUTPATIENT
Start: 2018-08-29 | End: 2018-08-30 | Stop reason: HOSPADM

## 2018-08-29 RX ORDER — GLYCOPYRROLATE 0.2 MG/ML
INJECTION INTRAMUSCULAR; INTRAVENOUS AS NEEDED
Status: DISCONTINUED | OUTPATIENT
Start: 2018-08-29 | End: 2018-08-29 | Stop reason: SURG

## 2018-08-29 RX ORDER — OXYCODONE AND ACETAMINOPHEN 7.5; 325 MG/1; MG/1
1 TABLET ORAL ONCE AS NEEDED
Status: DISCONTINUED | OUTPATIENT
Start: 2018-08-29 | End: 2018-08-30 | Stop reason: HOSPADM

## 2018-08-29 RX ORDER — PROMETHAZINE HYDROCHLORIDE 25 MG/1
25 TABLET ORAL ONCE AS NEEDED
Status: DISCONTINUED | OUTPATIENT
Start: 2018-08-29 | End: 2018-08-30 | Stop reason: HOSPADM

## 2018-08-29 RX ORDER — DIPHENHYDRAMINE HYDROCHLORIDE 50 MG/ML
12.5 INJECTION INTRAMUSCULAR; INTRAVENOUS
Status: DISCONTINUED | OUTPATIENT
Start: 2018-08-29 | End: 2018-08-30 | Stop reason: HOSPADM

## 2018-08-29 RX ORDER — EPHEDRINE SULFATE 50 MG/ML
5 INJECTION, SOLUTION INTRAVENOUS ONCE AS NEEDED
Status: DISCONTINUED | OUTPATIENT
Start: 2018-08-29 | End: 2018-08-30 | Stop reason: HOSPADM

## 2018-08-29 RX ORDER — FLUMAZENIL 0.1 MG/ML
0.2 INJECTION INTRAVENOUS AS NEEDED
Status: DISCONTINUED | OUTPATIENT
Start: 2018-08-29 | End: 2018-08-30 | Stop reason: HOSPADM

## 2018-08-29 RX ORDER — ONDANSETRON 2 MG/ML
4 INJECTION INTRAMUSCULAR; INTRAVENOUS ONCE AS NEEDED
Status: DISCONTINUED | OUTPATIENT
Start: 2018-08-29 | End: 2018-08-30 | Stop reason: HOSPADM

## 2018-08-29 RX ORDER — PROMETHAZINE HYDROCHLORIDE 25 MG/1
25 SUPPOSITORY RECTAL ONCE AS NEEDED
Status: DISCONTINUED | OUTPATIENT
Start: 2018-08-29 | End: 2018-08-30 | Stop reason: HOSPADM

## 2018-08-29 RX ORDER — SUCCINYLCHOLINE CHLORIDE 20 MG/ML
INJECTION INTRAMUSCULAR; INTRAVENOUS AS NEEDED
Status: DISCONTINUED | OUTPATIENT
Start: 2018-08-29 | End: 2018-08-29 | Stop reason: SURG

## 2018-08-29 RX ORDER — PROPOFOL 10 MG/ML
VIAL (ML) INTRAVENOUS CONTINUOUS PRN
Status: DISCONTINUED | OUTPATIENT
Start: 2018-08-29 | End: 2018-08-29 | Stop reason: SURG

## 2018-08-29 RX ADMIN — GLYCOPYRROLATE 0.2 MCG: 0.2 INJECTION INTRAMUSCULAR; INTRAVENOUS at 08:28

## 2018-08-29 RX ADMIN — SODIUM CHLORIDE, POTASSIUM CHLORIDE, SODIUM LACTATE AND CALCIUM CHLORIDE: 600; 310; 30; 20 INJECTION, SOLUTION INTRAVENOUS at 08:30

## 2018-08-29 RX ADMIN — SUCCINYLCHOLINE CHLORIDE 80 MG: 20 INJECTION, SOLUTION INTRAMUSCULAR; INTRAVENOUS; PARENTERAL at 08:28

## 2018-08-29 RX ADMIN — PROPOFOL 120 MG/HR: 10 INJECTION, EMULSION INTRAVENOUS at 08:28

## 2018-08-29 NOTE — ANESTHESIA PREPROCEDURE EVALUATION
Anesthesia Evaluation     Patient summary reviewed and Nursing notes reviewed   NPO Solid Status: > 8 hours  NPO Liquid Status: > 2 hours           Airway   Mallampati: II  TM distance: >3 FB  Neck ROM: full  no difficulty expected  Dental - normal exam     Pulmonary - negative pulmonary ROS and normal exam   Cardiovascular - negative cardio ROS and normal exam  Exercise tolerance: good (4-7 METS)    ECG reviewed  Rhythm: regular  Rate: normal    (-) hypertension      Neuro/Psych  (+) psychiatric history Depression,     (-) seizures, CVA  GI/Hepatic/Renal/Endo    (+)  GERD,    (-) diabetes    Musculoskeletal (-) negative ROS    Abdominal  - normal exam   Substance History - negative use  (-) alcohol use, drug use     OB/GYN negative ob/gyn ROS         Other - negative ROS                         Anesthesia Plan    ASA 2     general     intravenous induction   Anesthetic plan and risks discussed with patient.

## 2018-08-30 ENCOUNTER — APPOINTMENT (OUTPATIENT)
Dept: PSYCHIATRY | Facility: HOSPITAL | Age: 63
End: 2018-08-30

## 2018-08-31 ENCOUNTER — ANESTHESIA EVENT (OUTPATIENT)
Dept: POSTOP/PACU | Facility: HOSPITAL | Age: 63
End: 2018-08-31

## 2018-08-31 ENCOUNTER — HOSPITAL ENCOUNTER (OUTPATIENT)
Dept: POSTOP/PACU | Facility: HOSPITAL | Age: 63
Discharge: HOME | End: 2018-08-31
Attending: SPECIALIST | Admitting: SPECIALIST

## 2018-08-31 ENCOUNTER — APPOINTMENT (OUTPATIENT)
Dept: PSYCHIATRY | Facility: HOSPITAL | Age: 63
End: 2018-08-31

## 2018-08-31 ENCOUNTER — ANESTHESIA (OUTPATIENT)
Dept: POSTOP/PACU | Facility: HOSPITAL | Age: 63
End: 2018-08-31

## 2018-08-31 VITALS
DIASTOLIC BLOOD PRESSURE: 92 MMHG | OXYGEN SATURATION: 96 % | SYSTOLIC BLOOD PRESSURE: 122 MMHG | WEIGHT: 220 LBS | HEIGHT: 72 IN | TEMPERATURE: 97.9 F | RESPIRATION RATE: 18 BRPM | HEART RATE: 79 BPM | BODY MASS INDEX: 29.8 KG/M2

## 2018-08-31 VITALS — DIASTOLIC BLOOD PRESSURE: 85 MMHG | SYSTOLIC BLOOD PRESSURE: 137 MMHG | OXYGEN SATURATION: 91 %

## 2018-08-31 DIAGNOSIS — F32.A DEPRESSION: ICD-10-CM

## 2018-08-31 PROCEDURE — 90870 ELECTROCONVULSIVE THERAPY: CPT

## 2018-08-31 PROCEDURE — 25010000002 PROPOFOL 10 MG/ML EMULSION: Performed by: ANESTHESIOLOGY

## 2018-08-31 PROCEDURE — 25010000002 SUCCINYLCHOLINE PER 20 MG: Performed by: ANESTHESIOLOGY

## 2018-08-31 RX ORDER — PROMETHAZINE HYDROCHLORIDE 25 MG/ML
12.5 INJECTION, SOLUTION INTRAMUSCULAR; INTRAVENOUS ONCE AS NEEDED
Status: DISCONTINUED | OUTPATIENT
Start: 2018-08-31 | End: 2018-09-01 | Stop reason: HOSPADM

## 2018-08-31 RX ORDER — PROMETHAZINE HYDROCHLORIDE 25 MG/1
25 TABLET ORAL ONCE AS NEEDED
Status: DISCONTINUED | OUTPATIENT
Start: 2018-08-31 | End: 2018-09-01 | Stop reason: HOSPADM

## 2018-08-31 RX ORDER — NALOXONE HCL 0.4 MG/ML
0.2 VIAL (ML) INJECTION AS NEEDED
Status: DISCONTINUED | OUTPATIENT
Start: 2018-08-31 | End: 2018-09-01 | Stop reason: HOSPADM

## 2018-08-31 RX ORDER — SUCCINYLCHOLINE CHLORIDE 20 MG/ML
INJECTION INTRAMUSCULAR; INTRAVENOUS AS NEEDED
Status: DISCONTINUED | OUTPATIENT
Start: 2018-08-31 | End: 2018-08-31 | Stop reason: SURG

## 2018-08-31 RX ORDER — FLUMAZENIL 0.1 MG/ML
0.2 INJECTION INTRAVENOUS AS NEEDED
Status: DISCONTINUED | OUTPATIENT
Start: 2018-08-31 | End: 2018-09-01 | Stop reason: HOSPADM

## 2018-08-31 RX ORDER — LABETALOL HYDROCHLORIDE 5 MG/ML
5 INJECTION, SOLUTION INTRAVENOUS
Status: DISCONTINUED | OUTPATIENT
Start: 2018-08-31 | End: 2018-09-01 | Stop reason: HOSPADM

## 2018-08-31 RX ORDER — PROPOFOL 10 MG/ML
VIAL (ML) INTRAVENOUS AS NEEDED
Status: DISCONTINUED | OUTPATIENT
Start: 2018-08-31 | End: 2018-08-31 | Stop reason: SURG

## 2018-08-31 RX ORDER — GLYCOPYRROLATE 0.2 MG/ML
INJECTION INTRAMUSCULAR; INTRAVENOUS AS NEEDED
Status: DISCONTINUED | OUTPATIENT
Start: 2018-08-31 | End: 2018-08-31 | Stop reason: SURG

## 2018-08-31 RX ORDER — DIPHENHYDRAMINE HYDROCHLORIDE 50 MG/ML
12.5 INJECTION INTRAMUSCULAR; INTRAVENOUS
Status: DISCONTINUED | OUTPATIENT
Start: 2018-08-31 | End: 2018-09-01 | Stop reason: HOSPADM

## 2018-08-31 RX ORDER — SODIUM CHLORIDE, SODIUM LACTATE, POTASSIUM CHLORIDE, CALCIUM CHLORIDE 600; 310; 30; 20 MG/100ML; MG/100ML; MG/100ML; MG/100ML
INJECTION, SOLUTION INTRAVENOUS CONTINUOUS PRN
Status: DISCONTINUED | OUTPATIENT
Start: 2018-08-31 | End: 2018-08-31 | Stop reason: SURG

## 2018-08-31 RX ORDER — ONDANSETRON 2 MG/ML
4 INJECTION INTRAMUSCULAR; INTRAVENOUS ONCE AS NEEDED
Status: DISCONTINUED | OUTPATIENT
Start: 2018-08-31 | End: 2018-09-01 | Stop reason: HOSPADM

## 2018-08-31 RX ORDER — EPHEDRINE SULFATE 50 MG/ML
5 INJECTION, SOLUTION INTRAVENOUS ONCE AS NEEDED
Status: DISCONTINUED | OUTPATIENT
Start: 2018-08-31 | End: 2018-09-01 | Stop reason: HOSPADM

## 2018-08-31 RX ORDER — PROMETHAZINE HYDROCHLORIDE 25 MG/1
25 SUPPOSITORY RECTAL ONCE AS NEEDED
Status: DISCONTINUED | OUTPATIENT
Start: 2018-08-31 | End: 2018-09-01 | Stop reason: HOSPADM

## 2018-08-31 RX ADMIN — SODIUM CHLORIDE, POTASSIUM CHLORIDE, SODIUM LACTATE AND CALCIUM CHLORIDE: 600; 310; 30; 20 INJECTION, SOLUTION INTRAVENOUS at 08:55

## 2018-08-31 RX ADMIN — PROPOFOL 120 MG: 10 INJECTION, EMULSION INTRAVENOUS at 08:55

## 2018-08-31 RX ADMIN — GLYCOPYRROLATE 0.2 MCG: 0.2 INJECTION INTRAMUSCULAR; INTRAVENOUS at 08:55

## 2018-08-31 RX ADMIN — SUCCINYLCHOLINE CHLORIDE 80 MG: 20 INJECTION, SOLUTION INTRAMUSCULAR; INTRAVENOUS; PARENTERAL at 08:55

## 2018-09-04 ENCOUNTER — APPOINTMENT (OUTPATIENT)
Dept: PSYCHIATRY | Facility: HOSPITAL | Age: 63
End: 2018-09-04

## 2018-09-05 ENCOUNTER — APPOINTMENT (OUTPATIENT)
Dept: PSYCHIATRY | Facility: HOSPITAL | Age: 63
End: 2018-09-05

## 2018-09-06 ENCOUNTER — APPOINTMENT (OUTPATIENT)
Dept: PSYCHIATRY | Facility: HOSPITAL | Age: 63
End: 2018-09-06

## 2018-09-07 ENCOUNTER — OFFICE VISIT (OUTPATIENT)
Dept: PSYCHIATRY | Facility: HOSPITAL | Age: 63
End: 2018-09-07

## 2018-09-10 ENCOUNTER — HOSPITAL ENCOUNTER (OUTPATIENT)
Dept: OTHER | Facility: HOSPITAL | Age: 63
Setting detail: SPECIMEN
Discharge: HOME | End: 2018-09-10
Attending: UROLOGY | Admitting: UROLOGY

## 2019-12-11 ENCOUNTER — TRANSCRIBE ORDERS (OUTPATIENT)
Dept: ADMINISTRATIVE | Facility: HOSPITAL | Age: 64
End: 2019-12-11

## 2019-12-11 ENCOUNTER — HOSPITAL ENCOUNTER (OUTPATIENT)
Dept: CARDIOLOGY | Facility: HOSPITAL | Age: 64
Discharge: HOME | End: 2019-12-11

## 2019-12-11 ENCOUNTER — HOSPITAL ENCOUNTER (OUTPATIENT)
Dept: GENERAL RADIOLOGY | Facility: HOSPITAL | Age: 64
Discharge: HOME | End: 2019-12-11
Admitting: UROLOGY

## 2019-12-11 ENCOUNTER — LAB (OUTPATIENT)
Dept: LAB | Facility: HOSPITAL | Age: 64
End: 2019-12-11

## 2019-12-11 DIAGNOSIS — Z01.818 PRE-OP EXAMINATION: ICD-10-CM

## 2019-12-11 DIAGNOSIS — Z01.818 PRE-OP EXAMINATION: Primary | ICD-10-CM

## 2019-12-11 LAB
ANION GAP SERPL CALCULATED.3IONS-SCNC: 10.1 MMOL/L (ref 5–15)
BASOPHILS # BLD AUTO: 0.05 10*3/MM3 (ref 0–0.2)
BASOPHILS NFR BLD AUTO: 0.9 % (ref 0–1.5)
BUN BLD-MCNC: 18 MG/DL (ref 8–23)
BUN/CREAT SERPL: 16.5 (ref 7–25)
CALCIUM SPEC-SCNC: 9.4 MG/DL (ref 8.6–10.5)
CHLORIDE SERPL-SCNC: 101 MMOL/L (ref 98–107)
CO2 SERPL-SCNC: 27.9 MMOL/L (ref 22–29)
CREAT BLD-MCNC: 1.09 MG/DL (ref 0.76–1.27)
DEPRECATED RDW RBC AUTO: 40.4 FL (ref 37–54)
EOSINOPHIL # BLD AUTO: 0 10*3/MM3 (ref 0–0.4)
EOSINOPHIL NFR BLD AUTO: 0 % (ref 0.3–6.2)
ERYTHROCYTE [DISTWIDTH] IN BLOOD BY AUTOMATED COUNT: 12.7 % (ref 12.3–15.4)
GFR SERPL CREATININE-BSD FRML MDRD: 69 ML/MIN/1.73
GLUCOSE BLD-MCNC: 89 MG/DL (ref 65–99)
HCT VFR BLD AUTO: 45 % (ref 37.5–51)
HGB BLD-MCNC: 15.1 G/DL (ref 13–17.7)
IMM GRANULOCYTES # BLD AUTO: 0.01 10*3/MM3 (ref 0–0.05)
IMM GRANULOCYTES NFR BLD AUTO: 0.2 % (ref 0–0.5)
LYMPHOCYTES # BLD AUTO: 1.01 10*3/MM3 (ref 0.7–3.1)
LYMPHOCYTES NFR BLD AUTO: 19.2 % (ref 19.6–45.3)
MCH RBC QN AUTO: 29.4 PG (ref 26.6–33)
MCHC RBC AUTO-ENTMCNC: 33.6 G/DL (ref 31.5–35.7)
MCV RBC AUTO: 87.5 FL (ref 79–97)
MONOCYTES # BLD AUTO: 0.7 10*3/MM3 (ref 0.1–0.9)
MONOCYTES NFR BLD AUTO: 13.3 % (ref 5–12)
NEUTROPHILS # BLD AUTO: 3.5 10*3/MM3 (ref 1.7–7)
NEUTROPHILS NFR BLD AUTO: 66.4 % (ref 42.7–76)
NRBC BLD AUTO-RTO: 0 /100 WBC (ref 0–0.2)
PLATELET # BLD AUTO: 146 10*3/MM3 (ref 140–450)
PMV BLD AUTO: 9.9 FL (ref 6–12)
POTASSIUM BLD-SCNC: 4.4 MMOL/L (ref 3.5–5.2)
RBC # BLD AUTO: 5.14 10*6/MM3 (ref 4.14–5.8)
SODIUM BLD-SCNC: 139 MMOL/L (ref 136–145)
WBC NRBC COR # BLD: 5.27 10*3/MM3 (ref 3.4–10.8)

## 2019-12-11 PROCEDURE — 80048 BASIC METABOLIC PNL TOTAL CA: CPT

## 2019-12-11 PROCEDURE — 87077 CULTURE AEROBIC IDENTIFY: CPT

## 2019-12-11 PROCEDURE — 87086 URINE CULTURE/COLONY COUNT: CPT

## 2019-12-11 PROCEDURE — 93005 ELECTROCARDIOGRAM TRACING: CPT | Performed by: UROLOGY

## 2019-12-11 PROCEDURE — 71046 X-RAY EXAM CHEST 2 VIEWS: CPT

## 2019-12-11 PROCEDURE — 85025 COMPLETE CBC W/AUTO DIFF WBC: CPT

## 2019-12-11 PROCEDURE — 36415 COLL VENOUS BLD VENIPUNCTURE: CPT

## 2019-12-11 PROCEDURE — 87186 SC STD MICRODIL/AGAR DIL: CPT

## 2019-12-13 LAB — BACTERIA SPEC AEROBE CULT: ABNORMAL

## 2019-12-13 PROCEDURE — 93010 ELECTROCARDIOGRAM REPORT: CPT | Performed by: INTERNAL MEDICINE

## 2021-04-14 ENCOUNTER — OFFICE (AMBULATORY)
Dept: URBAN - METROPOLITAN AREA PATHOLOGY 4 | Facility: PATHOLOGY | Age: 66
End: 2021-04-14
Payer: COMMERCIAL

## 2021-04-14 ENCOUNTER — ON CAMPUS - OUTPATIENT (AMBULATORY)
Dept: URBAN - METROPOLITAN AREA HOSPITAL 2 | Facility: HOSPITAL | Age: 66
End: 2021-04-14
Payer: COMMERCIAL

## 2021-04-14 VITALS
SYSTOLIC BLOOD PRESSURE: 128 MMHG | DIASTOLIC BLOOD PRESSURE: 99 MMHG | HEART RATE: 118 BPM | HEART RATE: 100 BPM | DIASTOLIC BLOOD PRESSURE: 93 MMHG | HEART RATE: 92 BPM | OXYGEN SATURATION: 95 % | HEART RATE: 115 BPM | DIASTOLIC BLOOD PRESSURE: 95 MMHG | SYSTOLIC BLOOD PRESSURE: 129 MMHG | SYSTOLIC BLOOD PRESSURE: 131 MMHG | DIASTOLIC BLOOD PRESSURE: 96 MMHG | SYSTOLIC BLOOD PRESSURE: 116 MMHG | DIASTOLIC BLOOD PRESSURE: 70 MMHG | SYSTOLIC BLOOD PRESSURE: 145 MMHG | HEART RATE: 97 BPM | OXYGEN SATURATION: 96 % | HEART RATE: 103 BPM | HEART RATE: 117 BPM | OXYGEN SATURATION: 97 % | DIASTOLIC BLOOD PRESSURE: 90 MMHG | OXYGEN SATURATION: 98 % | SYSTOLIC BLOOD PRESSURE: 136 MMHG | SYSTOLIC BLOOD PRESSURE: 138 MMHG | DIASTOLIC BLOOD PRESSURE: 101 MMHG | HEIGHT: 71 IN | DIASTOLIC BLOOD PRESSURE: 85 MMHG | SYSTOLIC BLOOD PRESSURE: 125 MMHG | HEART RATE: 77 BPM | RESPIRATION RATE: 18 BRPM | RESPIRATION RATE: 16 BRPM | WEIGHT: 255 LBS | OXYGEN SATURATION: 94 % | TEMPERATURE: 97.3 F

## 2021-04-14 DIAGNOSIS — K22.2 ESOPHAGEAL OBSTRUCTION: ICD-10-CM

## 2021-04-14 DIAGNOSIS — K57.30 DIVERTICULOSIS OF LARGE INTESTINE WITHOUT PERFORATION OR ABS: ICD-10-CM

## 2021-04-14 DIAGNOSIS — R13.10 DYSPHAGIA, UNSPECIFIED: ICD-10-CM

## 2021-04-14 DIAGNOSIS — K22.70 BARRETT'S ESOPHAGUS WITHOUT DYSPLASIA: ICD-10-CM

## 2021-04-14 DIAGNOSIS — K44.9 DIAPHRAGMATIC HERNIA WITHOUT OBSTRUCTION OR GANGRENE: ICD-10-CM

## 2021-04-14 DIAGNOSIS — K64.1 SECOND DEGREE HEMORRHOIDS: ICD-10-CM

## 2021-04-14 DIAGNOSIS — Z86.010 PERSONAL HISTORY OF COLONIC POLYPS: ICD-10-CM

## 2021-04-14 LAB
GI HISTOLOGY: A. SELECT: (no result)
GI HISTOLOGY: PDF REPORT: (no result)

## 2021-04-14 PROCEDURE — 43239 EGD BIOPSY SINGLE/MULTIPLE: CPT | Mod: 59 | Performed by: INTERNAL MEDICINE

## 2021-04-14 PROCEDURE — 43450 DILATE ESOPHAGUS 1/MULT PASS: CPT | Performed by: INTERNAL MEDICINE

## 2021-04-14 PROCEDURE — 88305 TISSUE EXAM BY PATHOLOGIST: CPT | Performed by: INTERNAL MEDICINE

## 2021-04-14 PROCEDURE — 45378 DIAGNOSTIC COLONOSCOPY: CPT | Mod: 33 | Performed by: INTERNAL MEDICINE

## 2021-09-13 ENCOUNTER — HOSPITAL ENCOUNTER (OUTPATIENT)
Facility: HOSPITAL | Age: 66
Discharge: HOME | End: 2021-09-15
Attending: EMERGENCY MEDICINE | Admitting: FAMILY MEDICINE

## 2021-09-13 ENCOUNTER — APPOINTMENT (OUTPATIENT)
Dept: CT IMAGING | Facility: HOSPITAL | Age: 66
End: 2021-09-13

## 2021-09-13 DIAGNOSIS — K80.20 CALCULUS OF GALLBLADDER WITHOUT CHOLECYSTITIS WITHOUT OBSTRUCTION: ICD-10-CM

## 2021-09-13 DIAGNOSIS — E80.6 HYPERBILIRUBINEMIA: ICD-10-CM

## 2021-09-13 DIAGNOSIS — R11.0 NAUSEA: ICD-10-CM

## 2021-09-13 DIAGNOSIS — K75.9 HEPATITIS: ICD-10-CM

## 2021-09-13 DIAGNOSIS — R10.13 EPIGASTRIC PAIN: Primary | ICD-10-CM

## 2021-09-13 PROBLEM — E66.9 OBESITY (BMI 30.0-34.9): Chronic | Status: ACTIVE | Noted: 2021-09-13

## 2021-09-13 PROBLEM — Q61.02 MULTIPLE RENAL CYSTS: Status: ACTIVE | Noted: 2021-09-13

## 2021-09-13 PROBLEM — G47.30 SLEEP APNEA: Status: ACTIVE | Noted: 2018-06-04

## 2021-09-13 PROBLEM — K44.9 HIATAL HERNIA: Status: ACTIVE | Noted: 2021-09-13

## 2021-09-13 PROBLEM — N29 NEPHROCALCINOSIS: Status: ACTIVE | Noted: 2021-09-13

## 2021-09-13 PROBLEM — E83.59 NEPHROCALCINOSIS: Status: ACTIVE | Noted: 2021-09-13

## 2021-09-13 PROBLEM — R79.89 ELEVATED LFTS: Status: ACTIVE | Noted: 2021-09-13

## 2021-09-13 PROBLEM — R10.9 ABDOMINAL PAIN: Status: ACTIVE | Noted: 2021-09-13

## 2021-09-13 PROBLEM — N30.00 ACUTE CYSTITIS: Status: ACTIVE | Noted: 2018-08-17

## 2021-09-13 PROBLEM — D69.6 THROMBOCYTOPENIA (HCC): Status: ACTIVE | Noted: 2021-09-13

## 2021-09-13 PROBLEM — E66.811 OBESITY (BMI 30.0-34.9): Chronic | Status: ACTIVE | Noted: 2021-09-13

## 2021-09-13 PROBLEM — K63.9: Status: ACTIVE | Noted: 2021-09-13

## 2021-09-13 PROBLEM — K57.90 DIVERTICULOSIS: Status: ACTIVE | Noted: 2021-09-13

## 2021-09-13 LAB
ALBUMIN SERPL-MCNC: 4.1 G/DL (ref 3.5–5.2)
ALBUMIN/GLOB SERPL: 1.3 G/DL
ALP SERPL-CCNC: 247 U/L (ref 39–117)
ALT SERPL W P-5'-P-CCNC: 403 U/L (ref 1–41)
AMPHET+METHAMPHET UR QL: NEGATIVE
ANION GAP SERPL CALCULATED.3IONS-SCNC: 10 MMOL/L (ref 5–15)
APTT PPP: 27.3 SECONDS (ref 24–31)
AST SERPL-CCNC: 119 U/L (ref 1–40)
BACTERIA UR QL AUTO: ABNORMAL /HPF
BARBITURATES UR QL SCN: NEGATIVE
BASOPHILS # BLD AUTO: 0 10*3/MM3 (ref 0–0.2)
BASOPHILS NFR BLD AUTO: 0.8 % (ref 0–1.5)
BENZODIAZ UR QL SCN: NEGATIVE
BILIRUB SERPL-MCNC: 3.9 MG/DL (ref 0–1.2)
BILIRUB UR QL STRIP: ABNORMAL
BUN SERPL-MCNC: 16 MG/DL (ref 8–23)
BUN/CREAT SERPL: 13.4 (ref 7–25)
CALCIUM SPEC-SCNC: 9.5 MG/DL (ref 8.6–10.5)
CANNABINOIDS SERPL QL: NEGATIVE
CHLORIDE SERPL-SCNC: 104 MMOL/L (ref 98–107)
CLARITY UR: CLEAR
CO2 SERPL-SCNC: 24 MMOL/L (ref 22–29)
COCAINE UR QL: NEGATIVE
COLOR UR: ABNORMAL
CREAT SERPL-MCNC: 1.19 MG/DL (ref 0.76–1.27)
D-LACTATE SERPL-SCNC: 1.2 MMOL/L (ref 0.5–2)
DEPRECATED RDW RBC AUTO: 45.1 FL (ref 37–54)
EOSINOPHIL # BLD AUTO: 0.1 10*3/MM3 (ref 0–0.4)
EOSINOPHIL NFR BLD AUTO: 2.3 % (ref 0.3–6.2)
ERYTHROCYTE [DISTWIDTH] IN BLOOD BY AUTOMATED COUNT: 14.6 % (ref 12.3–15.4)
GFR SERPL CREATININE-BSD FRML MDRD: 62 ML/MIN/1.73
GLOBULIN UR ELPH-MCNC: 3.2 GM/DL
GLUCOSE SERPL-MCNC: 140 MG/DL (ref 65–99)
GLUCOSE UR STRIP-MCNC: NEGATIVE MG/DL
HAV IGM SERPL QL IA: NORMAL
HBV CORE IGM SERPL QL IA: NORMAL
HBV SURFACE AG SERPL QL IA: NORMAL
HCT VFR BLD AUTO: 46.6 % (ref 37.5–51)
HCV AB SER DONR QL: NORMAL
HGB BLD-MCNC: 15.3 G/DL (ref 13–17.7)
HGB UR QL STRIP.AUTO: NEGATIVE
HOLD SPECIMEN: NORMAL
HOLD SPECIMEN: NORMAL
HYALINE CASTS UR QL AUTO: ABNORMAL /LPF
INR PPP: 0.94 (ref 0.93–1.1)
KETONES UR QL STRIP: ABNORMAL
LEUKOCYTE ESTERASE UR QL STRIP.AUTO: ABNORMAL
LIPASE SERPL-CCNC: 41 U/L (ref 13–60)
LYMPHOCYTES # BLD AUTO: 0.7 10*3/MM3 (ref 0.7–3.1)
LYMPHOCYTES NFR BLD AUTO: 11.4 % (ref 19.6–45.3)
MAGNESIUM SERPL-MCNC: 2.1 MG/DL (ref 1.6–2.4)
MCH RBC QN AUTO: 28.9 PG (ref 26.6–33)
MCHC RBC AUTO-ENTMCNC: 32.9 G/DL (ref 31.5–35.7)
MCV RBC AUTO: 88 FL (ref 79–97)
METHADONE UR QL SCN: NEGATIVE
MONOCYTES # BLD AUTO: 0.6 10*3/MM3 (ref 0.1–0.9)
MONOCYTES NFR BLD AUTO: 10.2 % (ref 5–12)
NEUTROPHILS NFR BLD AUTO: 4.5 10*3/MM3 (ref 1.7–7)
NEUTROPHILS NFR BLD AUTO: 75.3 % (ref 42.7–76)
NITRITE UR QL STRIP: POSITIVE
NRBC BLD AUTO-RTO: 0.1 /100 WBC (ref 0–0.2)
OPIATES UR QL: NEGATIVE
OXYCODONE UR QL SCN: NEGATIVE
PH UR STRIP.AUTO: 6 [PH] (ref 5–8)
PHOSPHATE SERPL-MCNC: 2.3 MG/DL (ref 2.5–4.5)
PLATELET # BLD AUTO: 168 10*3/MM3 (ref 140–450)
PMV BLD AUTO: 7.4 FL (ref 6–12)
POTASSIUM SERPL-SCNC: 4.5 MMOL/L (ref 3.5–5.2)
PROT SERPL-MCNC: 7.3 G/DL (ref 6–8.5)
PROT UR QL STRIP: ABNORMAL
PROTHROMBIN TIME: 10.5 SECONDS (ref 9.6–11.7)
RBC # BLD AUTO: 5.3 10*6/MM3 (ref 4.14–5.8)
RBC # UR: ABNORMAL /HPF
REF LAB TEST METHOD: ABNORMAL
SARS-COV-2 RNA PNL SPEC NAA+PROBE: NOT DETECTED
SODIUM SERPL-SCNC: 138 MMOL/L (ref 136–145)
SP GR UR STRIP: >=1.03 (ref 1–1.03)
SQUAMOUS #/AREA URNS HPF: ABNORMAL /HPF
TROPONIN T SERPL-MCNC: <0.01 NG/ML (ref 0–0.03)
UROBILINOGEN UR QL STRIP: ABNORMAL
WBC # BLD AUTO: 6 10*3/MM3 (ref 3.4–10.8)
WBC UR QL AUTO: ABNORMAL /HPF
WHOLE BLOOD HOLD SPECIMEN: NORMAL

## 2021-09-13 PROCEDURE — 83605 ASSAY OF LACTIC ACID: CPT | Performed by: NURSE PRACTITIONER

## 2021-09-13 PROCEDURE — 80307 DRUG TEST PRSMV CHEM ANLYZR: CPT | Performed by: NURSE PRACTITIONER

## 2021-09-13 PROCEDURE — 80074 ACUTE HEPATITIS PANEL: CPT | Performed by: EMERGENCY MEDICINE

## 2021-09-13 PROCEDURE — 83735 ASSAY OF MAGNESIUM: CPT | Performed by: NURSE PRACTITIONER

## 2021-09-13 PROCEDURE — G0378 HOSPITAL OBSERVATION PER HR: HCPCS

## 2021-09-13 PROCEDURE — 87086 URINE CULTURE/COLONY COUNT: CPT | Performed by: FAMILY MEDICINE

## 2021-09-13 PROCEDURE — 74176 CT ABD & PELVIS W/O CONTRAST: CPT

## 2021-09-13 PROCEDURE — 81001 URINALYSIS AUTO W/SCOPE: CPT | Performed by: EMERGENCY MEDICINE

## 2021-09-13 PROCEDURE — 99219 PR INITIAL OBSERVATION CARE/DAY 50 MINUTES: CPT | Performed by: NURSE PRACTITIONER

## 2021-09-13 PROCEDURE — 96361 HYDRATE IV INFUSION ADD-ON: CPT

## 2021-09-13 PROCEDURE — 84484 ASSAY OF TROPONIN QUANT: CPT | Performed by: EMERGENCY MEDICINE

## 2021-09-13 PROCEDURE — 83690 ASSAY OF LIPASE: CPT | Performed by: EMERGENCY MEDICINE

## 2021-09-13 PROCEDURE — 85730 THROMBOPLASTIN TIME PARTIAL: CPT | Performed by: NURSE PRACTITIONER

## 2021-09-13 PROCEDURE — 80053 COMPREHEN METABOLIC PANEL: CPT | Performed by: EMERGENCY MEDICINE

## 2021-09-13 PROCEDURE — 87635 SARS-COV-2 COVID-19 AMP PRB: CPT | Performed by: EMERGENCY MEDICINE

## 2021-09-13 PROCEDURE — 25010000002 CEFTRIAXONE PER 250 MG: Performed by: NURSE PRACTITIONER

## 2021-09-13 PROCEDURE — 85025 COMPLETE CBC W/AUTO DIFF WBC: CPT | Performed by: EMERGENCY MEDICINE

## 2021-09-13 PROCEDURE — 84100 ASSAY OF PHOSPHORUS: CPT | Performed by: NURSE PRACTITIONER

## 2021-09-13 PROCEDURE — 99284 EMERGENCY DEPT VISIT MOD MDM: CPT

## 2021-09-13 PROCEDURE — 85610 PROTHROMBIN TIME: CPT | Performed by: NURSE PRACTITIONER

## 2021-09-13 PROCEDURE — C9803 HOPD COVID-19 SPEC COLLECT: HCPCS

## 2021-09-13 RX ORDER — ACETAMINOPHEN 160 MG/5ML
650 SOLUTION ORAL EVERY 4 HOURS PRN
Status: DISCONTINUED | OUTPATIENT
Start: 2021-09-13 | End: 2021-09-13

## 2021-09-13 RX ORDER — ACETAMINOPHEN 325 MG/1
650 TABLET ORAL EVERY 4 HOURS PRN
Status: DISCONTINUED | OUTPATIENT
Start: 2021-09-13 | End: 2021-09-13

## 2021-09-13 RX ORDER — SODIUM CHLORIDE 0.9 % (FLUSH) 0.9 %
10 SYRINGE (ML) INJECTION EVERY 12 HOURS SCHEDULED
Status: DISCONTINUED | OUTPATIENT
Start: 2021-09-13 | End: 2021-09-15 | Stop reason: HOSPADM

## 2021-09-13 RX ORDER — MAGNESIUM SULFATE HEPTAHYDRATE 40 MG/ML
2 INJECTION, SOLUTION INTRAVENOUS AS NEEDED
Status: DISCONTINUED | OUTPATIENT
Start: 2021-09-13 | End: 2021-09-15 | Stop reason: HOSPADM

## 2021-09-13 RX ORDER — SODIUM CHLORIDE 9 MG/ML
100 INJECTION, SOLUTION INTRAVENOUS CONTINUOUS
Status: DISCONTINUED | OUTPATIENT
Start: 2021-09-13 | End: 2021-09-15

## 2021-09-13 RX ORDER — MAGNESIUM SULFATE 1 G/100ML
1 INJECTION INTRAVENOUS AS NEEDED
Status: DISCONTINUED | OUTPATIENT
Start: 2021-09-13 | End: 2021-09-15 | Stop reason: HOSPADM

## 2021-09-13 RX ORDER — BUPROPION HYDROCHLORIDE 300 MG/1
300 TABLET ORAL DAILY
COMMUNITY

## 2021-09-13 RX ORDER — METOCLOPRAMIDE HYDROCHLORIDE 5 MG/ML
10 INJECTION INTRAMUSCULAR; INTRAVENOUS EVERY 6 HOURS PRN
Status: DISCONTINUED | OUTPATIENT
Start: 2021-09-13 | End: 2021-09-15 | Stop reason: HOSPADM

## 2021-09-13 RX ORDER — ONDANSETRON 2 MG/ML
4 INJECTION INTRAMUSCULAR; INTRAVENOUS EVERY 6 HOURS PRN
Status: DISCONTINUED | OUTPATIENT
Start: 2021-09-13 | End: 2021-09-15 | Stop reason: HOSPADM

## 2021-09-13 RX ORDER — SODIUM CHLORIDE 0.9 % (FLUSH) 0.9 %
10 SYRINGE (ML) INJECTION AS NEEDED
Status: DISCONTINUED | OUTPATIENT
Start: 2021-09-13 | End: 2021-09-15 | Stop reason: HOSPADM

## 2021-09-13 RX ORDER — PANTOPRAZOLE SODIUM 40 MG/10ML
40 INJECTION, POWDER, LYOPHILIZED, FOR SOLUTION INTRAVENOUS
Status: DISCONTINUED | OUTPATIENT
Start: 2021-09-14 | End: 2021-09-15 | Stop reason: SDUPTHER

## 2021-09-13 RX ORDER — ACETAMINOPHEN 650 MG/1
650 SUPPOSITORY RECTAL EVERY 4 HOURS PRN
Status: DISCONTINUED | OUTPATIENT
Start: 2021-09-13 | End: 2021-09-13

## 2021-09-13 RX ORDER — ONDANSETRON 4 MG/1
4 TABLET, FILM COATED ORAL EVERY 6 HOURS PRN
Status: DISCONTINUED | OUTPATIENT
Start: 2021-09-13 | End: 2021-09-15 | Stop reason: HOSPADM

## 2021-09-13 RX ORDER — NITROGLYCERIN 0.4 MG/1
0.4 TABLET SUBLINGUAL
Status: DISCONTINUED | OUTPATIENT
Start: 2021-09-13 | End: 2021-09-15 | Stop reason: HOSPADM

## 2021-09-13 RX ORDER — POTASSIUM CHLORIDE 20 MEQ/1
40 TABLET, EXTENDED RELEASE ORAL AS NEEDED
Status: DISCONTINUED | OUTPATIENT
Start: 2021-09-13 | End: 2021-09-15 | Stop reason: HOSPADM

## 2021-09-13 RX ORDER — QUETIAPINE FUMARATE 50 MG/1
25 TABLET, FILM COATED ORAL NIGHTLY
COMMUNITY

## 2021-09-13 RX ADMIN — CEFTRIAXONE SODIUM 1 G: 1 INJECTION, POWDER, FOR SOLUTION INTRAMUSCULAR; INTRAVENOUS at 23:10

## 2021-09-13 RX ADMIN — SODIUM CHLORIDE 125 ML/HR: 9 INJECTION, SOLUTION INTRAVENOUS at 23:10

## 2021-09-13 NOTE — ED NOTES
Pt c/o abdominal pain since Friday. Pt states he has been nauseous but has not been c/o any other symptoms at this time.      Pamela Dominguez RN  09/13/21 7837

## 2021-09-14 ENCOUNTER — ANESTHESIA EVENT (OUTPATIENT)
Dept: PERIOP | Facility: HOSPITAL | Age: 66
End: 2021-09-14

## 2021-09-14 ENCOUNTER — APPOINTMENT (OUTPATIENT)
Dept: MRI IMAGING | Facility: HOSPITAL | Age: 66
End: 2021-09-14

## 2021-09-14 ENCOUNTER — ON CAMPUS - OUTPATIENT (AMBULATORY)
Dept: URBAN - METROPOLITAN AREA HOSPITAL 85 | Facility: HOSPITAL | Age: 66
End: 2021-09-14
Payer: COMMERCIAL

## 2021-09-14 ENCOUNTER — ANESTHESIA (OUTPATIENT)
Dept: PERIOP | Facility: HOSPITAL | Age: 66
End: 2021-09-14

## 2021-09-14 DIAGNOSIS — R94.5 ABNORMAL RESULTS OF LIVER FUNCTION STUDIES: ICD-10-CM

## 2021-09-14 DIAGNOSIS — R10.13 EPIGASTRIC PAIN: ICD-10-CM

## 2021-09-14 DIAGNOSIS — R93.3 ABNORMAL FINDINGS ON DIAGNOSTIC IMAGING OF OTHER PARTS OF DI: ICD-10-CM

## 2021-09-14 DIAGNOSIS — K80.20 CALCULUS OF GALLBLADDER WITHOUT CHOLECYSTITIS WITHOUT OBSTRU: ICD-10-CM

## 2021-09-14 LAB
ALBUMIN SERPL-MCNC: 3.4 G/DL (ref 3.5–5.2)
ALP SERPL-CCNC: 253 U/L (ref 39–117)
ALT SERPL W P-5'-P-CCNC: 328 U/L (ref 1–41)
ANION GAP SERPL CALCULATED.3IONS-SCNC: 9 MMOL/L (ref 5–15)
AST SERPL-CCNC: 125 U/L (ref 1–40)
BASOPHILS # BLD AUTO: 0 10*3/MM3 (ref 0–0.2)
BASOPHILS NFR BLD AUTO: 0.8 % (ref 0–1.5)
BILIRUB CONJ SERPL-MCNC: 2.7 MG/DL (ref 0–0.3)
BILIRUB INDIRECT SERPL-MCNC: 0.4 MG/DL
BILIRUB SERPL-MCNC: 3.1 MG/DL (ref 0–1.2)
BUN SERPL-MCNC: 14 MG/DL (ref 8–23)
BUN/CREAT SERPL: 12.7 (ref 7–25)
CALCIUM SPEC-SCNC: 8.3 MG/DL (ref 8.6–10.5)
CHLORIDE SERPL-SCNC: 105 MMOL/L (ref 98–107)
CO2 SERPL-SCNC: 23 MMOL/L (ref 22–29)
CREAT SERPL-MCNC: 1.1 MG/DL (ref 0.76–1.27)
DEPRECATED RDW RBC AUTO: 45.1 FL (ref 37–54)
EOSINOPHIL # BLD AUTO: 0.2 10*3/MM3 (ref 0–0.4)
EOSINOPHIL NFR BLD AUTO: 3.4 % (ref 0.3–6.2)
ERYTHROCYTE [DISTWIDTH] IN BLOOD BY AUTOMATED COUNT: 14.7 % (ref 12.3–15.4)
GFR SERPL CREATININE-BSD FRML MDRD: 67 ML/MIN/1.73
GLUCOSE SERPL-MCNC: 106 MG/DL (ref 65–99)
HCT VFR BLD AUTO: 39.4 % (ref 37.5–51)
HGB BLD-MCNC: 13.3 G/DL (ref 13–17.7)
LYMPHOCYTES # BLD AUTO: 0.8 10*3/MM3 (ref 0.7–3.1)
LYMPHOCYTES NFR BLD AUTO: 17.6 % (ref 19.6–45.3)
MCH RBC QN AUTO: 30.2 PG (ref 26.6–33)
MCHC RBC AUTO-ENTMCNC: 33.7 G/DL (ref 31.5–35.7)
MCV RBC AUTO: 89.5 FL (ref 79–97)
MONOCYTES # BLD AUTO: 0.6 10*3/MM3 (ref 0.1–0.9)
MONOCYTES NFR BLD AUTO: 13 % (ref 5–12)
NEUTROPHILS NFR BLD AUTO: 3 10*3/MM3 (ref 1.7–7)
NEUTROPHILS NFR BLD AUTO: 65.2 % (ref 42.7–76)
NRBC BLD AUTO-RTO: 0.1 /100 WBC (ref 0–0.2)
PLATELET # BLD AUTO: 154 10*3/MM3 (ref 140–450)
PMV BLD AUTO: 7.7 FL (ref 6–12)
POTASSIUM SERPL-SCNC: 3.8 MMOL/L (ref 3.5–5.2)
PROT SERPL-MCNC: 6.1 G/DL (ref 6–8.5)
RBC # BLD AUTO: 4.4 10*6/MM3 (ref 4.14–5.8)
SODIUM SERPL-SCNC: 137 MMOL/L (ref 136–145)
WBC # BLD AUTO: 4.7 10*3/MM3 (ref 3.4–10.8)

## 2021-09-14 PROCEDURE — 96361 HYDRATE IV INFUSION ADD-ON: CPT

## 2021-09-14 PROCEDURE — G0378 HOSPITAL OBSERVATION PER HR: HCPCS

## 2021-09-14 PROCEDURE — 99226 PR SBSQ OBSERVATION CARE/DAY 35 MINUTES: CPT | Performed by: FAMILY MEDICINE

## 2021-09-14 PROCEDURE — 25010000002 DEXAMETHASONE PER 1 MG: Performed by: ANESTHESIOLOGY

## 2021-09-14 PROCEDURE — 25010000002 PROPOFOL 10 MG/ML EMULSION: Performed by: ANESTHESIOLOGY

## 2021-09-14 PROCEDURE — A9579 GAD-BASE MR CONTRAST NOS,1ML: HCPCS | Performed by: FAMILY MEDICINE

## 2021-09-14 PROCEDURE — 88304 TISSUE EXAM BY PATHOLOGIST: CPT | Performed by: SURGERY

## 2021-09-14 PROCEDURE — 25010000002 NEOSTIGMINE 5 MG/5ML SOLUTION: Performed by: ANESTHESIOLOGY

## 2021-09-14 PROCEDURE — 99204 OFFICE O/P NEW MOD 45 MIN: CPT | Performed by: SURGERY

## 2021-09-14 PROCEDURE — 25010000002 GADOTERIDOL PER 1 ML: Performed by: FAMILY MEDICINE

## 2021-09-14 PROCEDURE — 74183 MRI ABD W/O CNTR FLWD CNTR: CPT

## 2021-09-14 PROCEDURE — 99213 OFFICE O/P EST LOW 20 MIN: CPT | Performed by: NURSE PRACTITIONER

## 2021-09-14 PROCEDURE — C1889 IMPLANT/INSERT DEVICE, NOC: HCPCS | Performed by: SURGERY

## 2021-09-14 PROCEDURE — 25010000002 FENTANYL CITRATE (PF) 100 MCG/2ML SOLUTION: Performed by: ANESTHESIOLOGY

## 2021-09-14 PROCEDURE — 80048 BASIC METABOLIC PNL TOTAL CA: CPT | Performed by: NURSE PRACTITIONER

## 2021-09-14 PROCEDURE — 25010000002 ONDANSETRON PER 1 MG: Performed by: ANESTHESIOLOGY

## 2021-09-14 PROCEDURE — 80076 HEPATIC FUNCTION PANEL: CPT | Performed by: NURSE PRACTITIONER

## 2021-09-14 PROCEDURE — 25010000002 CEFTRIAXONE PER 250 MG: Performed by: SURGERY

## 2021-09-14 PROCEDURE — 85025 COMPLETE CBC W/AUTO DIFF WBC: CPT | Performed by: NURSE PRACTITIONER

## 2021-09-14 PROCEDURE — 96374 THER/PROPH/DIAG INJ IV PUSH: CPT

## 2021-09-14 PROCEDURE — 47562 LAPAROSCOPIC CHOLECYSTECTOMY: CPT | Performed by: SURGERY

## 2021-09-14 DEVICE — LIGAMAX 5 MM ENDOSCOPIC MULTIPLE CLIP APPLIER
Type: IMPLANTABLE DEVICE | Site: ABDOMEN | Status: FUNCTIONAL
Brand: LIGAMAX

## 2021-09-14 DEVICE — SEAL HEMO SURG ARISTA/AH ABS/PWDR 3GM: Type: IMPLANTABLE DEVICE | Site: ABDOMEN | Status: FUNCTIONAL

## 2021-09-14 RX ORDER — GLYCOPYRROLATE 1 MG/5 ML
SYRINGE (ML) INTRAVENOUS AS NEEDED
Status: DISCONTINUED | OUTPATIENT
Start: 2021-09-14 | End: 2021-09-14 | Stop reason: SURG

## 2021-09-14 RX ORDER — FENTANYL CITRATE 50 UG/ML
INJECTION, SOLUTION INTRAMUSCULAR; INTRAVENOUS AS NEEDED
Status: DISCONTINUED | OUTPATIENT
Start: 2021-09-14 | End: 2021-09-14 | Stop reason: SURG

## 2021-09-14 RX ORDER — DEXAMETHASONE SODIUM PHOSPHATE 4 MG/ML
INJECTION, SOLUTION INTRA-ARTICULAR; INTRALESIONAL; INTRAMUSCULAR; INTRAVENOUS; SOFT TISSUE AS NEEDED
Status: DISCONTINUED | OUTPATIENT
Start: 2021-09-14 | End: 2021-09-14 | Stop reason: SURG

## 2021-09-14 RX ORDER — NEOSTIGMINE METHYLSULFATE 5 MG/5 ML
SYRINGE (ML) INTRAVENOUS AS NEEDED
Status: DISCONTINUED | OUTPATIENT
Start: 2021-09-14 | End: 2021-09-14 | Stop reason: SURG

## 2021-09-14 RX ORDER — ROCURONIUM BROMIDE 10 MG/ML
INJECTION, SOLUTION INTRAVENOUS AS NEEDED
Status: DISCONTINUED | OUTPATIENT
Start: 2021-09-14 | End: 2021-09-14 | Stop reason: SURG

## 2021-09-14 RX ORDER — HYDROCODONE BITARTRATE AND ACETAMINOPHEN 5; 325 MG/1; MG/1
1 TABLET ORAL EVERY 4 HOURS PRN
Status: DISCONTINUED | OUTPATIENT
Start: 2021-09-14 | End: 2021-09-15 | Stop reason: HOSPADM

## 2021-09-14 RX ORDER — PROPOFOL 10 MG/ML
VIAL (ML) INTRAVENOUS AS NEEDED
Status: DISCONTINUED | OUTPATIENT
Start: 2021-09-14 | End: 2021-09-14 | Stop reason: SURG

## 2021-09-14 RX ORDER — LIDOCAINE HYDROCHLORIDE 20 MG/ML
INJECTION, SOLUTION EPIDURAL; INFILTRATION; INTRACAUDAL; PERINEURAL AS NEEDED
Status: DISCONTINUED | OUTPATIENT
Start: 2021-09-14 | End: 2021-09-14 | Stop reason: SURG

## 2021-09-14 RX ORDER — HYDROMORPHONE HCL 110MG/55ML
1 PATIENT CONTROLLED ANALGESIA SYRINGE INTRAVENOUS
Status: DISCONTINUED | OUTPATIENT
Start: 2021-09-14 | End: 2021-09-14 | Stop reason: HOSPADM

## 2021-09-14 RX ORDER — BUPIVACAINE HYDROCHLORIDE 2.5 MG/ML
INJECTION, SOLUTION EPIDURAL; INFILTRATION; INTRACAUDAL AS NEEDED
Status: DISCONTINUED | OUTPATIENT
Start: 2021-09-14 | End: 2021-09-14 | Stop reason: HOSPADM

## 2021-09-14 RX ORDER — HYDROCODONE BITARTRATE AND ACETAMINOPHEN 5; 325 MG/1; MG/1
2 TABLET ORAL EVERY 4 HOURS PRN
Status: DISCONTINUED | OUTPATIENT
Start: 2021-09-14 | End: 2021-09-15 | Stop reason: HOSPADM

## 2021-09-14 RX ORDER — ONDANSETRON 2 MG/ML
INJECTION INTRAMUSCULAR; INTRAVENOUS AS NEEDED
Status: DISCONTINUED | OUTPATIENT
Start: 2021-09-14 | End: 2021-09-14 | Stop reason: SURG

## 2021-09-14 RX ORDER — OXYCODONE HYDROCHLORIDE 5 MG/1
5 TABLET ORAL ONCE AS NEEDED
Status: DISCONTINUED | OUTPATIENT
Start: 2021-09-14 | End: 2021-09-14 | Stop reason: HOSPADM

## 2021-09-14 RX ORDER — MORPHINE SULFATE 4 MG/ML
5 INJECTION, SOLUTION INTRAMUSCULAR; INTRAVENOUS
Status: DISCONTINUED | OUTPATIENT
Start: 2021-09-14 | End: 2021-09-14 | Stop reason: HOSPADM

## 2021-09-14 RX ADMIN — ONDANSETRON 4 MG: 2 INJECTION INTRAMUSCULAR; INTRAVENOUS at 18:48

## 2021-09-14 RX ADMIN — FENTANYL CITRATE 100 MCG: 50 INJECTION, SOLUTION INTRAMUSCULAR; INTRAVENOUS at 17:44

## 2021-09-14 RX ADMIN — FENTANYL CITRATE 50 MCG: 50 INJECTION, SOLUTION INTRAMUSCULAR; INTRAVENOUS at 18:09

## 2021-09-14 RX ADMIN — SODIUM CHLORIDE 100 ML/HR: 9 INJECTION, SOLUTION INTRAVENOUS at 23:04

## 2021-09-14 RX ADMIN — SODIUM CHLORIDE 125 ML/HR: 9 INJECTION, SOLUTION INTRAVENOUS at 07:10

## 2021-09-14 RX ADMIN — SODIUM CHLORIDE: 9 INJECTION, SOLUTION INTRAVENOUS at 18:39

## 2021-09-14 RX ADMIN — DEXAMETHASONE SODIUM PHOSPHATE 4 MG: 4 INJECTION, SOLUTION INTRAMUSCULAR; INTRAVENOUS at 17:44

## 2021-09-14 RX ADMIN — Medication 10 ML: at 21:01

## 2021-09-14 RX ADMIN — PANTOPRAZOLE SODIUM 40 MG: 40 INJECTION, POWDER, FOR SOLUTION INTRAVENOUS at 07:09

## 2021-09-14 RX ADMIN — ROCURONIUM BROMIDE 50 MG: 10 INJECTION INTRAVENOUS at 17:44

## 2021-09-14 RX ADMIN — GADOTERIDOL 20 ML: 279.3 INJECTION, SOLUTION INTRAVENOUS at 10:16

## 2021-09-14 RX ADMIN — HYDROCODONE BITARTRATE AND ACETAMINOPHEN 2 TABLET: 5; 325 TABLET ORAL at 23:01

## 2021-09-14 RX ADMIN — Medication 0.6 MG: at 18:49

## 2021-09-14 RX ADMIN — LIDOCAINE HYDROCHLORIDE 100 MG: 20 INJECTION, SOLUTION EPIDURAL; INFILTRATION; INTRACAUDAL; PERINEURAL at 17:44

## 2021-09-14 RX ADMIN — CEFTRIAXONE SODIUM 1 G: 1 INJECTION, POWDER, FOR SOLUTION INTRAMUSCULAR; INTRAVENOUS at 23:02

## 2021-09-14 RX ADMIN — PROPOFOL 200 MG: 10 INJECTION, EMULSION INTRAVENOUS at 17:44

## 2021-09-14 RX ADMIN — Medication 3 MG: at 18:49

## 2021-09-14 RX ADMIN — FENTANYL CITRATE 50 MCG: 50 INJECTION, SOLUTION INTRAMUSCULAR; INTRAVENOUS at 18:15

## 2021-09-14 NOTE — CASE MANAGEMENT/SOCIAL WORK
Discharge Planning Assessment   [place]     Patient Name: Chilo Hurt  MRN: [id number]  Today's Date: 9/14/2021    Admit Date: 9/13/2021    Discharge Needs Assessment     Row Name 09/14/21 1448       Living Environment    Lives With  spouse    Current Living Arrangements  home/apartment/condo    Primary Care Provided by  self    Provides Primary Care For  no one    Family Caregiver if Needed  spouse    Quality of Family Relationships  helpful    Able to Return to Prior Arrangements  yes       Resource/Environmental Concerns    Resource/Environmental Concerns  none    Transportation Concerns  car, none       Transition Planning    Patient/Family Anticipates Transition to  home with family    Patient/Family Anticipated Services at Transition  none    Transportation Anticipated  family or friend will provide       Discharge Needs Assessment    Readmission Within the Last 30 Days  no previous admission in last 30 days    Equipment Currently Used at Home  cpap    Concerns to be Addressed  denies needs/concerns at this time    Anticipated Changes Related to Illness  none    Equipment Needed After Discharge  none        Discharge Plan     Row Name 09/14/21 1448       Plan    Plan  D/C Plan: Home with family.    Patient/Family in Agreement with Plan  yes    Plan Comments   spoke with patient's wife at bedside wearing mask and goggles and keeping distance greater than 6 feet and spent less than 15 minutes in room. Patient lives with spouse, is IADLs and drives. PCP and pharmacy verified-denies any difficulty affording meds. Spouse denies any d/c needs at this time and will provide transport at d/c. Barrier to D/C: IV abx, MRI today, NPO.          Expected Discharge Date and Time     Expected Discharge Date Expected Discharge Time    Sep 16, 2021         Demographic Summary     Row Name 09/14/21 1447       General Information    Admission Type  observation    Arrived From  emergency department    Referral  Source  admission list    Reason for Consult  discharge planning    Preferred Language  English     Used During This Interaction  no        Functional Status     Row Name 09/14/21 1448       Functional Status    Usual Activity Tolerance  good    Current Activity Tolerance  good       Functional Status, IADL    Medications  independent    Meal Preparation  independent    Housekeeping  independent    Laundry  independent    Shopping  independent       Mental Status    General Appearance WDL  WDL       Mental Status Summary    Recent Changes in Mental Status/Cognitive Functioning  no changes          Chelsea Rosas

## 2021-09-14 NOTE — PLAN OF CARE
Goal Outcome Evaluation:  Plan of Care Reviewed With: patient        Progress: no change  Outcome Summary: Patient in bed, was admitted from ED, no complaints at this time

## 2021-09-14 NOTE — ED PROVIDER NOTES
Subjective   Patient is a 66-year-old male complaint of several day history abdominal pain with nausea.  He denies cough fever vomit diarrhea or other complaint          Review of Systems  Negative for headache ears no cough fever chest pain shortness of breath Bondar dysuria case weight loss or other complaint.  A complete system was obtained and is otherwise negative  Past Medical History:   Diagnosis Date   • Depression    • GERD (gastroesophageal reflux disease)    • Prostate enlargement    • Urinary retention        No Known Allergies    No past surgical history on file.    No family history on file.    Social History     Socioeconomic History   • Marital status:      Spouse name: Not on file   • Number of children: Not on file   • Years of education: Not on file   • Highest education level: Not on file   Tobacco Use   • Smoking status: Never Smoker   Substance and Sexual Activity   • Alcohol use: No   • Drug use: No   • Sexual activity: Defer           Objective   Physical Exam  HEENT exam shows TMs to be clear.  Oropharynx comers but sclerae anicteric.  Neck has no adenopathy JVD or bruits.  Lungs are clear.  Heart has regular rate rhythm without murmur rub or gallop.  Chest is nontender.  Abdomen is soft with mild diffuse epigastric tenderness.  Patient has normal bowel sounds without rebound or guarding.  Back no CVA tenderness.  Extremity exam unremarkable.  Procedures           ED Course      Results for orders placed or performed during the hospital encounter of 09/13/21   Comprehensive Metabolic Panel    Specimen: Blood   Result Value Ref Range    Glucose 140 (H) 65 - 99 mg/dL    BUN 16 8 - 23 mg/dL    Creatinine 1.19 0.76 - 1.27 mg/dL    Sodium 138 136 - 145 mmol/L    Potassium 4.5 3.5 - 5.2 mmol/L    Chloride 104 98 - 107 mmol/L    CO2 24.0 22.0 - 29.0 mmol/L    Calcium 9.5 8.6 - 10.5 mg/dL    Total Protein 7.3 6.0 - 8.5 g/dL    Albumin 4.10 3.50 - 5.20 g/dL    ALT (SGPT) 403 (H) 1 - 41 U/L     AST (SGOT) 119 (H) 1 - 40 U/L    Alkaline Phosphatase 247 (H) 39 - 117 U/L    Total Bilirubin 3.9 (H) 0.0 - 1.2 mg/dL    eGFR Non African Amer 62 >60 mL/min/1.73    Globulin 3.2 gm/dL    A/G Ratio 1.3 g/dL    BUN/Creatinine Ratio 13.4 7.0 - 25.0    Anion Gap 10.0 5.0 - 15.0 mmol/L   Lipase    Specimen: Blood   Result Value Ref Range    Lipase 41 13 - 60 U/L   Troponin    Specimen: Blood   Result Value Ref Range    Troponin T <0.010 0.000 - 0.030 ng/mL   Urinalysis With Microscopic If Indicated (No Culture) - Urine, Clean Catch    Specimen: Urine, Clean Catch   Result Value Ref Range    Color, UA Chelsea (A) Yellow, Straw    Appearance, UA Clear Clear    pH, UA 6.0 5.0 - 8.0    Specific Gravity, UA >=1.030 1.005 - 1.030    Glucose, UA Negative Negative    Ketones, UA Trace (A) Negative    Bilirubin, UA Large (3+) (A) Negative    Blood, UA Negative Negative    Protein, UA 30 mg/dL (1+) (A) Negative    Leuk Esterase, UA Small (1+) (A) Negative    Nitrite, UA Positive (A) Negative    Urobilinogen, UA 4.0 E.U./dL (A) 0.2 - 1.0 E.U./dL   CBC Auto Differential    Specimen: Blood   Result Value Ref Range    WBC 6.00 3.40 - 10.80 10*3/mm3    RBC 5.30 4.14 - 5.80 10*6/mm3    Hemoglobin 15.3 13.0 - 17.7 g/dL    Hematocrit 46.6 37.5 - 51.0 %    MCV 88.0 79.0 - 97.0 fL    MCH 28.9 26.6 - 33.0 pg    MCHC 32.9 31.5 - 35.7 g/dL    RDW 14.6 12.3 - 15.4 %    RDW-SD 45.1 37.0 - 54.0 fl    MPV 7.4 6.0 - 12.0 fL    Platelets 168 140 - 450 10*3/mm3    Neutrophil % 75.3 42.7 - 76.0 %    Lymphocyte % 11.4 (L) 19.6 - 45.3 %    Monocyte % 10.2 5.0 - 12.0 %    Eosinophil % 2.3 0.3 - 6.2 %    Basophil % 0.8 0.0 - 1.5 %    Neutrophils, Absolute 4.50 1.70 - 7.00 10*3/mm3    Lymphocytes, Absolute 0.70 0.70 - 3.10 10*3/mm3    Monocytes, Absolute 0.60 0.10 - 0.90 10*3/mm3    Eosinophils, Absolute 0.10 0.00 - 0.40 10*3/mm3    Basophils, Absolute 0.00 0.00 - 0.20 10*3/mm3    nRBC 0.1 0.0 - 0.2 /100 WBC   Urinalysis, Microscopic Only - Urine, Clean  Catch    Specimen: Urine, Clean Catch   Result Value Ref Range    RBC, UA 6-12 (A) None Seen /HPF    WBC, UA 6-12 (A) None Seen /HPF    Bacteria, UA None Seen None Seen /HPF    Squamous Epithelial Cells, UA 0-2 None Seen, 0-2 /HPF    Hyaline Casts, UA 3-6 None Seen /LPF    Methodology Automated Microscopy    Green Top (Gel)   Result Value Ref Range    Extra Tube HOLD    Gold Top - SST   Result Value Ref Range    Extra Tube Hold for add-ons.    Light Blue Top   Result Value Ref Range    Extra Tube hold for add-on                                           MDM  Number of Diagnoses or Management Options  Diagnosis management comments: Patient has findings consistent with nonspecific hepatitis.  Patient does have a gallstone noted in his gallbladder on CT scan.  There is no ductal dilatation.  Patient has no evidence of acute infectious or metabolic abnormality.  Other than hepatitis.  I did speak to the on-call gastroenterologist.  Patient be brought in hospital for further evaluation including MRCP and consultation.  I did speak to the on-call hospitalist.       Amount and/or Complexity of Data Reviewed  Clinical lab tests: reviewed  Tests in the radiology section of CPT®: reviewed    Risk of Complications, Morbidity, and/or Mortality  Presenting problems: high  Diagnostic procedures: high  Management options: high    Patient Progress  Patient progress: stable      Final diagnoses:   Epigastric pain   Hepatitis   Nausea       ED Disposition  ED Disposition     ED Disposition Condition Comment    Decision to Admit            No follow-up provider specified.       Medication List      No changes were made to your prescriptions during this visit.          Hamzah Ross MD  09/13/21 2005

## 2021-09-14 NOTE — ANESTHESIA PREPROCEDURE EVALUATION
Anesthesia Evaluation     Patient summary reviewed and Nursing notes reviewed   NPO Solid Status: > 8 hours  NPO Liquid Status: > 8 hours           Airway   Mallampati: I  TM distance: >3 FB  Neck ROM: full  No difficulty expected  Dental - normal exam     Pulmonary - normal exam   (+) sleep apnea on CPAP,   Cardiovascular - negative cardio ROS and normal exam        Neuro/Psych  (+) psychiatric history Depression,     GI/Hepatic/Renal/Endo    (+)  hiatal hernia, GERD,  renal disease stones,     Musculoskeletal (-) negative ROS    Abdominal  - normal exam    Bowel sounds: normal.   Substance History - negative use     OB/GYN negative ob/gyn ROS         Other                        Anesthesia Plan    ASA 3     general     intravenous induction     Anesthetic plan, all risks, benefits, and alternatives have been provided, discussed and informed consent has been obtained with: patient.

## 2021-09-14 NOTE — ANESTHESIA PROCEDURE NOTES
Airway  Urgency: elective    Date/Time: 9/14/2021 5:49 PM  Airway not difficult    General Information and Staff    Patient location during procedure: OR    Indications and Patient Condition  Indications for airway management: airway protection    Preoxygenated: yes  MILS maintained throughout  Mask difficulty assessment: 1 - vent by mask    Final Airway Details  Final airway type: endotracheal airway      Successful airway: ETT  Cuffed: yes   Successful intubation technique: direct laryngoscopy  Endotracheal tube insertion site: oral  Blade: Daisy  Blade size: 3  ETT size (mm): 7.5  Cormack-Lehane Classification: grade I - full view of glottis  Placement verified by: chest auscultation and capnometry   Measured from: gums  ETT/EBT to gums (cm): 22  Number of attempts at approach: 1  Assessment: lips, teeth, and gum same as pre-op and atraumatic intubation

## 2021-09-14 NOTE — CONSULTS
GENERAL SURGERY CONSULTATION NOTE    Consult requested by: Dr. Grijalva    Patient Care Team:  Damon Bradford MD as PCP - General (Family Medicine)    Reason for consult: Symptomatic cholelithiasis    Subjective     Patient is a 66 y.o. male presents with epigastric abdominal pain which was cramping and severe and began on the evening of 9/10/2021 after eating pizza.  He reports that he had nausea, vomiting, and severe epigastric abdominal pain.  He was seen by his primary care physician who subsequently ordered labs and noted elevated LFTs.  He then was then told to come into the emergency room where he underwent a CT scan of the abdomen pelvis which demonstrated gallbladder containing gallstones and no evidence of biliary obstruction.  Given the elevation of his LFTs and concern for choledocholithiasis, the patient underwent an MRCP which was unremarkable as well.  Gastroenterology was consulted and recommended proceeding with laparoscopic cholecystectomy.  Today, the patient has been n.p.o., and no longer reports any epigastric discomfort.    Review of Systems   Constitutional: Positive for fatigue. Negative for appetite change, chills and fever.   HENT: Negative for congestion and sore throat.    Respiratory: Negative for cough and shortness of breath.    Cardiovascular: Negative for chest pain and palpitations.   Gastrointestinal: Positive for abdominal pain, constipation, nausea and vomiting. Negative for diarrhea and GERD.   Genitourinary: Negative for difficulty urinating, dysuria and frequency.   Musculoskeletal: Negative for arthralgias and back pain.   Skin: Positive for color change. Negative for rash and skin lesions.   Neurological: Negative for dizziness, seizures and memory problem.   Hematological: Negative for adenopathy. Does not bruise/bleed easily.   Psychiatric/Behavioral: Negative for sleep disturbance and depressed mood.        History  Past Medical History:   Diagnosis Date   •  Depression    • GERD (gastroesophageal reflux disease)    • Prostate enlargement    • Urinary retention      No past surgical history on file.  No family history on file.  Social History     Tobacco Use   • Smoking status: Never Smoker   Substance Use Topics   • Alcohol use: No   • Drug use: No     Medications Prior to Admission   Medication Sig Dispense Refill Last Dose   • ARIPiprazole (ABILIFY) 5 MG tablet Take 1 tablet by mouth Daily. 30 tablet 1    • buPROPion XL (WELLBUTRIN XL) 300 MG 24 hr tablet Take 300 mg by mouth Daily.      • DULoxetine (CYMBALTA) 60 MG capsule Take 1 capsule by mouth Daily. 30 capsule 1    • esomeprazole (nexIUM) 20 MG capsule Take 20 mg by mouth Every Morning Before Breakfast.      • QUEtiapine (SEROquel) 50 MG tablet Take 25 mg by mouth Every Night.        Allergies:  Patient has no known allergies.    Objective     Vital Signs  Temp:  [98.1 °F (36.7 °C)-98.7 °F (37.1 °C)] 98.6 °F (37 °C)  Heart Rate:  [69-78] 75  Resp:  [12-18] 12  BP: (118-158)/(81-98) 142/86    Physical Exam  Vitals reviewed.   Constitutional:       General: He is not in acute distress.     Appearance: He is well-developed. He is obese. He is not ill-appearing.   HENT:      Head: Normocephalic and atraumatic.   Eyes:      Pupils: Pupils are equal, round, and reactive to light.   Cardiovascular:      Rate and Rhythm: Normal rate and regular rhythm.   Pulmonary:      Effort: Pulmonary effort is normal.      Breath sounds: Normal breath sounds.   Abdominal:      General: There is no distension.      Palpations: Abdomen is soft.      Tenderness: There is no abdominal tenderness.      Hernia: No hernia is present.   Musculoskeletal:         General: Normal range of motion.      Cervical back: Normal range of motion.   Lymphadenopathy:      Cervical: No cervical adenopathy.   Skin:     General: Skin is warm and dry.      Coloration: Skin is jaundiced.      Findings: No rash.   Neurological:      General: No focal deficit  present.      Mental Status: He is alert and oriented to person, place, and time.   Psychiatric:         Mood and Affect: Mood normal.         Behavior: Behavior normal.         Thought Content: Thought content normal.         Judgment: Judgment normal.         Results Review:   Lab Results (last 24 hours)     Procedure Component Value Units Date/Time    Urine Culture - Urine, Urine, Clean Catch [[id number]] Collected: 09/13/21 1653    Specimen: Urine, Clean Catch Updated: 09/14/21 1306    Hepatic Function Panel [[id number]]  (Abnormal) Collected: 09/14/21 0228    Specimen: Blood Updated: 09/14/21 0825     Total Protein 6.1 g/dL      Albumin 3.40 g/dL      ALT (SGPT) 328 U/L      AST (SGOT) 125 U/L      Alkaline Phosphatase 253 U/L      Total Bilirubin 3.1 mg/dL      Bilirubin, Direct 2.7 mg/dL      Bilirubin, Indirect 0.4 mg/dL     Basic Metabolic Panel [[id number]]  (Abnormal) Collected: 09/14/21 0228    Specimen: Blood Updated: 09/14/21 0310     Glucose 106 mg/dL      BUN 14 mg/dL      Creatinine 1.10 mg/dL      Sodium 137 mmol/L      Potassium 3.8 mmol/L      Chloride 105 mmol/L      CO2 23.0 mmol/L      Calcium 8.3 mg/dL      eGFR Non African Amer 67 mL/min/1.73      BUN/Creatinine Ratio 12.7     Anion Gap 9.0 mmol/L     Narrative:      GFR Normal >60  Chronic Kidney Disease <60  Kidney Failure <15      CBC Auto Differential [[id number]]  (Abnormal) Collected: 09/14/21 0228    Specimen: Blood Updated: 09/14/21 0249     WBC 4.70 10*3/mm3      RBC 4.40 10*6/mm3      Hemoglobin 13.3 g/dL      Comment: Result checked         Hematocrit 39.4 %      MCV 89.5 fL      MCH 30.2 pg      MCHC 33.7 g/dL      RDW 14.7 %      RDW-SD 45.1 fl      MPV 7.7 fL      Platelets 154 10*3/mm3      Neutrophil % 65.2 %      Lymphocyte % 17.6 %      Monocyte % 13.0 %      Eosinophil % 3.4 %      Basophil % 0.8 %      Neutrophils, Absolute 3.00 10*3/mm3      Lymphocytes, Absolute 0.80 10*3/mm3      Monocytes, Absolute 0.60 10*3/mm3       Eosinophils, Absolute 0.20 10*3/mm3      Basophils, Absolute 0.00 10*3/mm3      nRBC 0.1 /100 WBC     Phosphorus [[id number]]  (Abnormal) Collected: 09/13/21 2215    Specimen: Blood Updated: 09/13/21 2301     Phosphorus 2.3 mg/dL     Magnesium [[id number]]  (Normal) Collected: 09/13/21 2215    Specimen: Blood Updated: 09/13/21 2300     Magnesium 2.1 mg/dL     Lactic Acid, Plasma [[id number]]  (Normal) Collected: 09/13/21 2215    Specimen: Blood Updated: 09/13/21 2258     Lactate 1.2 mmol/L     aPTT [[id number]]  (Normal) Collected: 09/13/21 2215    Specimen: Blood Updated: 09/13/21 2252     PTT 27.3 seconds     Protime-INR [[id number]]  (Normal) Collected: 09/13/21 2215    Specimen: Blood Updated: 09/13/21 2252     Protime 10.5 Seconds      INR 0.94    Urine Drug Screen - Urine, Clean Catch [[id number]]  (Normal) Collected: 09/13/21 1653    Specimen: Urine, Clean Catch Updated: 09/13/21 2221     Amphet/Methamphet, Screen Negative     Barbiturates Screen, Urine Negative     Benzodiazepine Screen, Urine Negative     Cocaine Screen, Urine Negative     Opiate Screen Negative     THC, Screen, Urine Negative     Methadone Screen, Urine Negative     Oxycodone Screen, Urine Negative    Narrative:      Negative Thresholds Per Drugs Screened:    Amphetamines                 500 ng/ml  Barbiturates                 200 ng/ml  Benzodiazepines              100 ng/ml  Cocaine                      300 ng/ml  Methadone                    300 ng/ml  Opiates                      300 ng/ml  Oxycodone                    100 ng/ml  THC                           50 ng/ml    The Normal Value for all drugs tested is negative. This report includes final unconfirmed screening results to be used for medical treatment purposes only. Unconfirmed results must not be used for non-medical purposes such as employment or legal testing. Clinical consideration should be applied to any drug of abuse test, particularly when unconfirmed results are used.           All urine drugs of abuse requests without chain of custody are for medical screening purposes only.  False positives are possible.      COVID PRE-OP / PRE-PROCEDURE SCREENING ORDER (NO ISOLATION) - Swab, Nasopharynx [[id number]]  (Normal) Collected: 09/13/21 2030    Specimen: Swab from Nasopharynx Updated: 09/13/21 2053    Narrative:      The following orders were created for panel order COVID PRE-OP / PRE-PROCEDURE SCREENING ORDER (NO ISOLATION) - Swab, Nasopharynx.  Procedure                               Abnormality         Status                     ---------                               -----------         ------                     COVID-19,CEPHEID/GARRET/BD...[[id number]]  Normal              Final result                 Please view results for these tests on the individual orders.    COVID-19,CEPHEID/GARRET/BDMAX,COR/LIZA/PAD/PEYTON IN-HOUSE(OR EMERGENT/ADD-ON),NP SWAB IN TRANSPORT MEDIA 3-4 HR TAT, RT-PCR - Swab, Nasopharynx [[id number]]  (Normal) Collected: 09/13/21 2030    Specimen: Swab from Nasopharynx Updated: 09/13/21 2053     COVID19 Not Detected    Narrative:      Fact sheet for providers: https://www.fda.gov/media/377407/download     Fact sheet for patients: https://www.fda.gov/media/096786/download  Fact sheet for providers: https://www.fda.gov/media/797104/download    Fact sheet for patients: https://www.fda.gov/media/442833/download    Test performed by PCR.        CT Abdomen Pelvis Without Contrast    Result Date: 9/13/2021  1.Cholelithiasis without definite CT evidence of acute biliary pathology. 2.No acute hepatic abnormality is seen on this limited noncontrast study. 3.Bladder wall thickening. Correlate with urinalysis. 4.Cecum is displaced into the mid abdomen without definite evidence of bowel obstruction or acute bowel abnormality at this time. Diverticulosis. 5.Multiple renal cysts including parapelvic cysts with subtle hyperdense attenuation of the medullary pyramids which could indicate  nephrocalcinosis. 6.Small hiatal hernia.    Electronically Signed By-Sundar Mccormick MD On:9/13/2021 7:26 PM This report was finalized on [id number] by  Sundar Mccormick MD.    MRI abdomen w wo contrast mrcp    Result Date: 9/14/2021   1. Uncomplicated cholelithiasis. No choledocholithiasis. 2. No abnormal biliary ductal dilation or pancreatic ductal dilation. No biliary stricture. 3. Bilateral renal cysts and small right hepatic cyst. 4. No acute inflammatory changes are seen within the imaged abdomen. 5. Minimal posterior right basilar atelectasis.  Electronically Signed By-Geneva Ibrahim MD On:9/14/2021 1:25 PM This report was finalized on [id number] by  Geneva Ibrahim MD.        I reviewed the patient's new imaging results and agree with the interpretation.  I reviewed the patient's other test results and agree with the interpretation    Assessment/Plan     Principal Problem:    Abdominal pain  Active Problems:    Severe recurrent major depression with psychotic features (CMS/HCC)    Gastroesophageal reflux disease without esophagitis    Benign prostatic hyperplasia without lower urinary tract symptoms    Acute cystitis    Sleep apnea    Elevated LFTs    Hyperbilirubinemia    Thrombocytopenia (CMS/HCC)    Cholelithiasis    Diverticulosis    Hiatal hernia    Disorder of cecum    Nephrocalcinosis    Multiple renal cysts    Obesity (BMI 30.0-34.9)    I discussed with the patient the findings of gallstones, and reviewed the natural history of gallstones as well as management options which include both operative and nonoperative strategies.  We discussed the risk, benefits, and alternatives to laparoscopic cholecystectomy which include but are not limited to: bleeding, infection, damage to surrounding structures including bowel and common bile duct, cystic stump leak, and possible need to convert to open.  The patient understands, and agrees to proceed with surgery.    I discussed the patients findings and my  recommendations with the patient.     Norm Mendenhall MD  09/14/21  16:44 EDT

## 2021-09-14 NOTE — PROGRESS NOTES
[facility] Medicine Services Daily Progress Note    Patient Name: Chilo Hurt  : 1955  MRN: [id number]  Primary Care Physician:  Damon Bradford MD  Date of admission: 2021      Subjective      Chief Complaint: Abdominal pain      Patient Reports abdominal pain has resolved.  No nausea currently.  MRCP showing no signs of obstruction, general surgery consulted for possible cholecystectomy.  Patient spouse reporting patient notably jaundiced with dark urine the last few days which has since improved.    Review of Systems   Constitutional: Negative for chills and fever.   HENT: Negative for hoarse voice and stridor.    Eyes: Negative for double vision and photophobia.   Cardiovascular: Negative for chest pain and leg swelling.   Respiratory: Negative for cough and shortness of breath.    Musculoskeletal: Negative for falls and stiffness.   Gastrointestinal: Positive for abdominal pain and jaundice. Negative for nausea and vomiting.   Genitourinary: Negative for dysuria and flank pain.   Neurological: Negative for dizziness and weakness.   Psychiatric/Behavioral: Negative for altered mental status. The patient is not nervous/anxious.           Objective      Vitals:   Temp:  [98.1 °F (36.7 °C)-98.7 °F (37.1 °C)] 98.6 °F (37 °C)  Heart Rate:  [69-78] 75  Resp:  [12-18] 12  BP: (118-158)/(81-98) 142/86    Physical Exam     General: Obese elderly male sitting up in bed breathing comfortably on room air no acute distress  HEENT: NC/AT, EOMI, mucosa moist  Heart: Regular, rate controlled  Chest: Normal work of breathing no wheezing  Abdominal: Soft.  Mild distention, right upper quadrant tenderness  Musculoskeletal: Normal ROM.  No edema. No calf tenderness.  Neurological: AAOx3, no focal deficits  Skin: Skin is warm and dry. No rash  Psychiatric: Normal mood and affect.         Result Review    Result Review:  I have personally reviewed the results from the time of this  admission to 9/14/2021 14:09 EDT and agree with these findings:  [x]  Laboratory  [x]  Microbiology  [x]  Radiology  [x]  EKG/Telemetry   []  Cardiology/Vascular   []  Pathology  []  Old records  []  Other:  Most notable findings include: Transaminitis, hyperbilirubinemia, hypophosphatemia          Assessment/Plan      Brief Patient Summary:    Chilo Hurt is a 66 y.o. male w/PMH of sleep apnea, palpitations, HTN, tachycardia, ESBL, GERD, anxiety, depression, prostatic urethral stone presents to [facility] due to abdominal pain.  Patient states dull nonradiating epigastric pain has progressively worsened over the last 3 days, no aggravating or alleviating factors noted.  Patient admits to fatigue, dry cough, fever, nausea, vomiting, bloating.  Patient denies chest pain, dyspnea, edema, palpitations, illegal drug use, illegal alcohol use.  As pain did not improve he decided to go to [facility] ED.     Upon arrival to the ED vitals temp 98.5, HR 83, RR 16, /89, O2 sat 95% on room air.  Labs notable for troponin less than 0.010, glucose 140, , K4.5, CO2 24, creatinine 1.19, BUN 16, GFR 62, , , total bili 3.9, lipase 41, WBC 6, Hgb 15.3, platelets 168, hepatitis panel nonreactive.  UA shows trace ketones, nitrite positive, +1 leukocytes, +3 bilirubin.  CT the abdomen pelvis W/0 shows cholelithiasis without definite CT evidence of acute biliary pathology. No acute hepatic abnormality is seen on this limited noncontrast study..Bladder wall thickening. Correlate with urinalysis. Cecum is displaced into the mid abdomen without definite evidence of bowel obstruction or acute bowel abnormality at this time. Diverticulosis. .Multiple renal cysts including parapelvic cysts with subtle hyperdense attenuation of the medullary pyramids which could indicate nephrocalcinosis..Small hiatal hernia.  ED provider spoke with gastroenterology who requested admission and MRCP, will see  patient in a.m.      cefTRIAXone, 1 g, Intravenous, Q24H  pantoprazole, 40 mg, Intravenous, Q AM  sodium chloride, 10 mL, Intravenous, Q12H       sodium chloride, 100 mL/hr, Last Rate: 100 mL/hr (09/14/21 1307)         Active Hospital Problems:  Active Hospital Problems    Diagnosis    • **Abdominal pain    • Elevated LFTs    • Hyperbilirubinemia    • Thrombocytopenia (CMS/HCC)    • Cholelithiasis    • Diverticulosis    • Hiatal hernia    • Disorder of cecum    • Nephrocalcinosis    • Multiple renal cysts    • Obesity (BMI 30.0-34.9)    • Acute cystitis    • Severe recurrent major depression with psychotic features (CMS/HCC)    • Gastroesophageal reflux disease without esophagitis    • Benign prostatic hyperplasia without lower urinary tract symptoms    • Sleep apnea      Plan:     Abdominal pain-appears most likely due to symptomatic cholelithiasis with transaminitis and hyperbilirubinemia patient may have transient obstruction but MRCP negative for obstruction currently indicating possible passing of stone, patient with possible secondary pain associated with pyuria or acute cystitis  -Antibiotics started for acute cystitis  -Follow-up urine culture  -MRCP showing no biliary obstruction  -General surgery consulted for cholecystectomy  -GI evaluated patient and reviewed MRCP no indication for ERCP currently  -Antiemetics    Transaminitis-with hyperbilirubinemia possibly secondary to transient biliary obstruction but appears to be improving as patient is no longer jaundiced  -Monitor daily  -GI monitoring    Hypophosphatemia-mild in nature likely due to decreased p.o. intake recently with abdominal pain  -Repeat tomorrow    Pyuria-versus acute cystitis abdominal pain more likely associated with cholelithiasis  -On antibiotics empirically  -Follow-up urine culture    BPH/depression/GERD-chronic in nature  -Resume home medication as clinically appropriate    BITA-patient on positive pressure ventilation at night  -Can  continue    Obesity-BMI of 33  -Lifestyle modification    DVT prophylaxis:  Mechanical DVT prophylaxis orders are present.    CODE STATUS:    Code Status: CPR  Medical Interventions (Level of Support Prior to Arrest): Full      Disposition:  I expect patient to be discharged in 24 hours.    This patient has been examined wearing appropriate Personal Protective Equipment and discussed with hospital infection control department. 09/14/21      Electronically signed by Erasto Grijalva MD, 09/14/21, 14:09 EDT.  [facility] Hospitalist Team

## 2021-09-14 NOTE — CONSULTS
GI CONSULT  NOTE:    Referring Provider:  Dr. Ross    Chief complaint: Abdominal pain, elevated LFTs    Subjective .     History of present illness: Chilo Hurt is a 66 y.o. male with history of BITA, hypertension, and anxiety/depression who presents with complaints of abdominal pain.  The patient reported epigastric pain for the past 3 days.  He was unable to identify any exacerbating or alleviating factors.  The pain did not radiate.  The pain was dull in nature.  He denies any nausea/vomiting, heartburn, or dysphagia.  No bright red blood per rectum or melena.  No recent fever.  Of note, the patient does report that abdominal pain has resolved currently.      Endo History:  4/2021 EGD/colonoscopy (Dr. 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    Social History:  Social History     Tobacco Use   • Smoking status: Never Smoker   Substance Use Topics   • Alcohol use: No   • Drug use: No       Family History:  No family history on file.    Medications:  Medications Prior to Admission   Medication Sig Dispense Refill Last Dose   • ARIPiprazole (ABILIFY) 5 MG tablet Take 1 tablet by mouth Daily. 30 tablet 1    • buPROPion XL (WELLBUTRIN XL) 300 MG 24 hr tablet Take 300 mg by mouth Daily.      • DULoxetine (CYMBALTA) 60 MG capsule Take 1 capsule by mouth Daily. 30 capsule 1    • esomeprazole (nexIUM) 20 MG capsule Take 20 mg by mouth Every Morning Before Breakfast.      • QUEtiapine (SEROquel) 50 MG tablet Take 25 mg by mouth Every Night.          Scheduled Meds:cefTRIAXone, 1 g, Intravenous, Q24H  pantoprazole, 40 mg, Intravenous, Q AM  sodium  "chloride, 10 mL, Intravenous, Q12H      Continuous Infusions:sodium chloride, 125 mL/hr, Last Rate: 125 mL/hr (09/14/21 0710)      PRN Meds:.magnesium sulfate **OR** magnesium sulfate in D5W 1g/100mL (PREMIX)  •  metoclopramide  •  nitroglycerin  •  ondansetron **OR** ondansetron  •  potassium chloride  •  sodium chloride  •  sodium chloride    ALLERGIES:  Patient has no known allergies.    ROS:  The following systems were reviewed and negative;   Constitution:  No fevers, chills, no unintentional weight loss  Skin: no rash, no jaundice  Eyes:  No blurry vision, no eye pain  HENT:  No change in hearing or smell  Resp:  No dyspnea or cough  CV:  No chest pain or palpitations  :  No dysuria, hematuria  Musculoskeletal:  No leg cramps or arthralgias  Neuro:  No tremor, no numbness  Psych:  No depression or confusion    Objective     Vital Signs:   Vitals:    09/13/21 2300 09/14/21 0100 09/14/21 0500 09/14/21 0854   BP: 123/84 118/81 158/98 145/87   BP Location:    Right arm   Patient Position:    Lying   Pulse: 76 78 74 69   Resp: 18 16 16 12   Temp: 98.1 °F (36.7 °C) 98.6 °F (37 °C) 98.7 °F (37.1 °C) 98.2 °F (36.8 °C)   TempSrc:    Oral   SpO2: 100% 100% 100% 96%   Weight: 108 kg (238 lb 12.1 oz)  108 kg (238 lb 1.6 oz)    Height: 180 cm (70.87\")          Physical Exam:       General Appearance:    Awake and alert, in no acute distress   Head:    Normocephalic, without obvious abnormality, atraumatic   Throat:   No oral lesions, no thrush, oral mucosa moist   Lungs:     Respirations regular, even and unlabored   Chest Wall:    No abnormalities observed   Abdomen:     Soft, non-tender, no rebound or guarding, non-distended   Rectal:     Deferred   Extremities:   Moves all extremities, no edema, no cyanosis   Pulses:   Pulses palpable and equal bilaterally   Skin:   No rash, no jaundice, normal palpation   Lymph nodes:   No cervical, supraclavicular or submandibular palpable adenopathy   Neurologic:   Cranial nerves 2 " - 12 grossly intact, no asterixis       Results Review:   I reviewed the patient's labs and imaging.  CBC    Results from last 7 days   Lab Units 09/14/21 0228 09/13/21  1653   WBC 10*3/mm3 4.70 6.00   HEMOGLOBIN g/dL 13.3 15.3   PLATELETS 10*3/mm3 154 168     CMP   Results from last 7 days   Lab Units 09/14/21 0228 09/13/21 2215 09/13/21  1653   SODIUM mmol/L 137  --  138   POTASSIUM mmol/L 3.8  --  4.5   CHLORIDE mmol/L 105  --  104   CO2 mmol/L 23.0  --  24.0   BUN mg/dL 14  --  16   CREATININE mg/dL 1.10  --  1.19   GLUCOSE mg/dL 106*  --  140*   ALBUMIN g/dL 3.40*  --  4.10   BILIRUBIN mg/dL 3.1*  --  3.9*   ALK PHOS U/L 253*  --  247*   AST (SGOT) U/L 125*  --  119*   ALT (SGPT) U/L 328*  --  403*   MAGNESIUM mg/dL  --  2.1  --    PHOSPHORUS mg/dL  --  2.3*  --    LIPASE U/L  --   --  41     Cr Clearance Estimated Creatinine Clearance: 84.6 mL/min (by C-G formula based on SCr of 1.1 mg/dL).  Coag   Results from last 7 days   Lab Units 09/13/21 2215   INR  0.94   APTT seconds 27.3     HbA1C No results found for: HGBA1C  Blood Glucose No results found for: POCGLU  Infection     UA    Results from last 7 days   Lab Units 09/13/21  1653   NITRITE UA  Positive*   WBC UA /HPF 6-12*   BACTERIA UA /HPF None Seen   SQUAM EPITHEL UA /HPF 0-2     Radiology(recent) CT Abdomen Pelvis Without Contrast    Result Date: 9/13/2021  1.Cholelithiasis without definite CT evidence of acute biliary pathology. 2.No acute hepatic abnormality is seen on this limited noncontrast study. 3.Bladder wall thickening. Correlate with urinalysis. 4.Cecum is displaced into the mid abdomen without definite evidence of bowel obstruction or acute bowel abnormality at this time. Diverticulosis. 5.Multiple renal cysts including parapelvic cysts with subtle hyperdense attenuation of the medullary pyramids which could indicate nephrocalcinosis. 6.Small hiatal hernia.    Electronically Signed By-Sundar Mccormick MD On:9/13/2021 7:26 PM This report was  finalized on [id number] by  Sudnar Mccormick MD.         ASSESSMENT:  -Epigastric pain  -Elevated LFTs -rule out choledocholithiasis  -Cholelithiasis  -BITA  -Hypertension  -Anxiety/depression    PLAN:  Patient presents with complaints of epigastric pain.  LFTs noted to be significantly elevated.  Concerning for possible choledocholithiasis.  Total bilirubin is trending down.  Abdominal pain has resolved.  We will plan MRCP for further evaluation.  Consider ERCP pending results.  Maintain NPO.  Patient will need laparoscopic cholecystectomy at some point.  Analgesics as needed.  Continue PPI.  Supportive care.       I discussed the patients findings and my recommendations with the patient.  I will discuss case with Dr. Carrasco and change the plan accordingly.    We appreciate the referral.    Electronically signed by FLOR Jules, 09/14/21, 9:23 AM EDT.

## 2021-09-14 NOTE — H&P
[facility]      Patient Name: Chilo Hurt  : 1955  MRN: [id number]  Primary Care Physician:  Damon Bradford MD  Date of admission: 2021      Subjective      Chief Complaint: Abdominal pain    History of Present Illness:  Chilo Hurt is a 66 y.o. male w/PMH of sleep apnea, palpitations, HTN, tachycardia, ESBL, GERD, anxiety, depression, prostatic urethral stone presents to [facility] due to abdominal pain.  Patient states dull nonradiating epigastric pain has progressively worsened over the last 3 days, no aggravating or alleviating factors noted.  Patient admits to fatigue, dry cough, fever, nausea, vomiting, bloating.  Patient denies chest pain, dyspnea, edema, palpitations, illegal drug use, illegal alcohol use.  As pain did not improve he decided to go to [facility] ED.        Upon arrival to the ED vitals temp 98.5, HR 83, RR 16, /89, O2 sat 95% on room air.  Labs notable for troponin less than 0.010, glucose 140, , K4.5, CO2 24, creatinine 1.19, BUN 16, GFR 62, , , total bili 3.9, lipase 41, WBC 6, Hgb 15.3, platelets 168, hepatitis panel nonreactive.  UA shows trace ketones, nitrite positive, +1 leukocytes, +3 bilirubin.  CT the abdomen pelvis W/0 shows cholelithiasis without definite CT evidence of acute biliary pathology. No acute hepatic abnormality is seen on this limited noncontrast study..Bladder wall thickening. Correlate with urinalysis. Cecum is displaced into the mid abdomen without definite evidence of bowel obstruction or acute bowel abnormality at this time. Diverticulosis. .Multiple renal cysts including parapelvic cysts with subtle hyperdense attenuation of the medullary pyramids which could indicate nephrocalcinosis..Small hiatal hernia.  ED provider spoke with gastroenterology who requested admission and MRCP, will see patient in a.m.      Review of Systems   Constitutional:  Positive for fever and malaise/fatigue.   HENT: Negative.    Eyes: Negative.    Cardiovascular: Negative.  Negative for chest pain and dyspnea on exertion.   Respiratory: Positive for cough. Negative for shortness of breath.    Endocrine: Negative.    Hematologic/Lymphatic: Negative.    Skin: Negative.    Musculoskeletal: Positive for myalgias.   Gastrointestinal: Positive for bloating, abdominal pain, diarrhea, nausea and vomiting. Negative for melena.   Genitourinary: Negative for dysuria, hematuria, hesitancy and pelvic pain.   Neurological: Positive for weakness.   Psychiatric/Behavioral: Negative.    Allergic/Immunologic: Negative.    All other systems reviewed and are negative.       Personal History     Past Medical History:   Diagnosis Date   • Depression    • GERD (gastroesophageal reflux disease)    • Prostate enlargement    • Urinary retention        No past surgical history on file.    Family History:  Otherwise pertinent FHx was reviewed and not pertinent to current issue.    Social History:  reports that he has never smoked. He does not have any smokeless tobacco history on file. He reports that he does not drink alcohol and does not use drugs.    Home Medications:  Prior to Admission Medications     Prescriptions Last Dose Informant Patient Reported? Taking?    ARIPiprazole (ABILIFY) 5 MG tablet   No No    Take 1 tablet by mouth Daily.    buPROPion XL (WELLBUTRIN XL) 300 MG 24 hr tablet   Yes No    Take 300 mg by mouth Daily.    DULoxetine (CYMBALTA) 60 MG capsule   No No    Take 1 capsule by mouth Daily.    omeprazole (priLOSEC) 40 MG capsule  Pharmacy Yes No    Take 40 mg by mouth Daily.    QUEtiapine (SEROquel) 50 MG tablet   Yes No    Take 25 mg by mouth Every Night.    raNITIdine (ZANTAC) 300 MG tablet  Pharmacy Yes No    Take 300 mg by mouth Every Night.    silodosin (RAPAFLO) 8 MG capsule capsule  Pharmacy Yes No    Take 8 mg by mouth Daily With Breakfast.    traZODone (DESYREL) 100 MG tablet    No No    Take 1 tablet by mouth At Night As Needed for Sleep.            Allergies:  No Known Allergies    Objective      Vitals:   Temp:  [98.5 °F (36.9 °C)] 98.5 °F (36.9 °C)  Heart Rate:  [83] 83  Resp:  [16] 16  BP: (134-136)/(82-91) 135/82    Physical Exam  HENT:      Head: Normocephalic and atraumatic.      Right Ear: External ear normal.      Left Ear: External ear normal.      Nose: Nose normal.      Mouth/Throat:      Mouth: Mucous membranes are moist.   Eyes:      General: No scleral icterus.     Pupils: Pupils are equal, round, and reactive to light.   Cardiovascular:      Rate and Rhythm: Normal rate and regular rhythm.      Pulses: Normal pulses.      Heart sounds: Normal heart sounds.   Pulmonary:      Effort: Pulmonary effort is normal.      Breath sounds: Normal breath sounds and air entry.   Abdominal:      General: There is distension.      Tenderness: There is abdominal tenderness in the epigastric area.   Musculoskeletal:         General: Normal range of motion.      Cervical back: Normal range of motion.      Right lower leg: No edema.      Left lower leg: No edema.   Skin:     General: Skin is warm and dry.      Coloration: Skin is jaundiced.   Neurological:      Mental Status: He is alert and oriented to person, place, and time. Mental status is at baseline.      Cranial Nerves: No cranial nerve deficit or facial asymmetry.   Psychiatric:         Attention and Perception: Attention and perception normal.         Mood and Affect: Mood normal.         Speech: Speech normal.         Behavior: Behavior is cooperative.          Result Review    Result Review:  I have personally reviewed the results from the time of this admission to 9/13/2021 21:25 EDT and agree with these findings:  [x]  Laboratory  [x]  Microbiology  [x]  Radiology  [x]  EKG/Telemetry   [x]  Cardiology/Vascular   []  Pathology  []  Old records  []  Other:        Assessment/Plan        Active Hospital Problems:  Active Hospital  Problems    Diagnosis    • **Abdominal pain    • Elevated LFTs    • Hyperbilirubinemia    • Thrombocytopenia (CMS/HCC)    • Cholelithiasis    • Diverticulosis    • Hiatal hernia    • Disorder of cecum    • Nephrocalcinosis    • Multiple renal cysts    • Acute cystitis    • Gastroesophageal reflux disease without esophagitis    • Benign prostatic hyperplasia without lower urinary tract symptoms    • Severe recurrent major depression with psychotic features (CMS/HCC)    • Sleep apnea      Abdominal pain:  -CT the abdomen pelvis W/0 shows cholelithiasis without definite CT evidence of acute biliary pathology. No acute hepatic abnormality is seen on this limited noncontrast study..Bladder wall thickening. Correlate with urinalysis. Cecum is displaced into the mid abdomen without definite evidence of bowel obstruction or acute bowel abnormality at this time. Diverticulosis. .Multiple renal cysts including parapelvic cysts with subtle hyperdense attenuation of the medullary pyramids which could indicate nephrocalcinosis..Small hiatal hernia  -ED provider spoke with will see patient in the a.m.  -Strict n.p.o. until cleared by GI  - mL/hour continuous IV infusion ordered  -IV Reglan, IV Zofran ordered  -EKG, drug screen, PT/INR/PTT, lactic, phosphorus, magnesium  -SCDs for VTE prophylaxis  -Electrolyte protocol ordered    Elevated LFTs:   -,  upon arrival to the ED  -Hepatitis panel negative  -CT the abdomen pelvis W/0 shows cholelithiasis without definite CT evidence of acute biliary pathology. No acute hepatic abnormality is seen on this limited noncontrast study  -Gastroenterology consulted by the ED provider, will see patient in the a.m.  -MRCP in the a.m.    Hyperbilirubinemia:  -Upon arrival to the ED total bili 3.9  -Hepatitis panel negative  -Gastroenterology consulted by the ED provider and will see patient in the a.m.  -Daily weights  -Continuous continuous cardiac  monitoring    Thrombocytopenia:   -Upon arrival to the ED platelets 168  -Monitor CBC during admission    Cholelithiasis:  -CT of the abdomen pelvis shows cholelithiasis without definite CT evidence of acute biliary pathology.  -ED provider consulted gastroenterology see patient in the a.m.  -MRCP in the a.m.    Diverticulosis:  -CT abdomen pelvis W/0 shows mild diverticulosis  -Consider nutrition consult before discharge to educate on dietary considerations    Hiatal hernia:  -CT abdomen pelvis W/0 shows small hiatal hernia  -Continue to monitor    Disorder cecum:  -CT abdomen pelvis W/0 shows cecum is displaced into the mid abdomen without definite evidence of bowel obstruction or acute bowel abnormality at this time  -ED provider spoke with gastroenterology who will see patient in a.m.  -MRCP in the a.m.    Nephrocalcinosis/multiple renal cysts:  -Multiple renal cysts including parapelvic cysts with subtle hyperdense attenuation of the medullary pyramids which could indicate  -Consider nutrition consult before discharge to educate on dietary considerations  -Consider outpatient follow-up with urology    Acute cystitis:  -UA shows trace ketones, nitrite positive, +1 leukocytes, +3 bilirubin  -CT abdomen pelvis W/0 shows bladder wall thickening  -1 g IV Rocephin  - mL/hour continuous IV infusion ordered  -Monitor intake and output    GERD:  -Home medication Nexium  -40 mg IV Protonix daily while n.p.o.    BPH:  -No current medication    Depression:  -Home medication Abilify, Wellbutrin, Cymbalta, Seroquel    BITA:  -May use home CPAP during admission nightly as needed for sleep        DVT prophylaxis:  No DVT prophylaxis order currently exists.      Home medication list verified by nursing and/or pharmacy prior to provider review      CODE STATUS:       Admission Status:  I believe this patient meets observation status.      I discussed the patient's findings and my recommendations with the patient.        The  patient was interviewed wearing appropriate personal protective equipment.        Signature: Electronically signed by FLOR Carreno, 09/14/21, 12:47 AM EDT.

## 2021-09-14 NOTE — ANESTHESIA POSTPROCEDURE EVALUATION
Patient: Chilo Hurt    Procedure Summary     Date: 09/14/21 Room / [facility]  / [facility] MA[facility]    Anesthesia Start: 1740 Anesthesia Stop: 1901    Procedure: CHOLECYSTECTOMY LAPAROSCOPIC (N/A Abdomen) Diagnosis:       Calculus of gallbladder without cholecystitis without obstruction      (Calculus of gallbladder without cholecystitis without obstruction [K80.20])    Surgeons: Norm Mendenhall MD Provider: Rigoberto Duarte DO    Anesthesia Type: general ASA Status: 3          Anesthesia Type: general    Vitals  Vitals Value Taken Time   /104 09/14/21 1903   Temp     Pulse 104 09/14/21 1904   Resp     SpO2 94 % 09/14/21 1904   Vitals shown include unvalidated device data.        Post Anesthesia Care and Evaluation    Patient location during evaluation: PACU  Patient participation: complete - patient participated  Level of consciousness: awake  Pain scale: See nurse's notes for pain score.  Pain management: adequate  Airway patency: patent  Anesthetic complications: No anesthetic complications  PONV Status: none  Cardiovascular status: acceptable  Respiratory status: acceptable  Hydration status: acceptable    Comments: Patient seen and examined postoperatively; vital signs stable; SpO2 greater than or equal to 90%; cardiopulmonary status stable; nausea/vomiting adequately controlled; pain adequately controlled; no apparent anesthesia complications; patient discharged from anesthesia care when discharge criteria were met

## 2021-09-14 NOTE — OP NOTE
CHOLECYSTECTOMY LAPAROSCOPIC  Operative Note    Patient Name:  Chilo Hurt  YOB: 1955    Date of Surgery:  9/14/2021     Indications: The patient is a 66 y.o. male who presents with gallstones. The risks, benefits, and alternatives to laparoscopic cholecystectomy were discussed in detail and the patient agreed to proceed to the OR for laparoscopic removal of the gallbladder.    Pre-op Diagnosis:   Calculus of gallbladder without cholecystitis without obstruction [K80.20]    Post-op Diagnosis:  Post-Op Diagnosis Codes:     * Calculus of gallbladder without cholecystitis without obstruction [K80.20]    Procedure/CPT® Codes:      Procedure(s):  CHOLECYSTECTOMY LAPAROSCOPIC    Staff:  Surgeon(s):  Norm Mendenhall MD    Anesthesia: General    Estimated Blood Loss: minimal    Implants:    Implant Name Type Inv. Item Serial No.  Lot No. LRB No. Used Action   CLIPAPPLR M/ ENDO LIGAMAX5 5MM 33CM MD/LG - [id number] Implant CLIPAPPLR M/ ENDO LIGAMAX5 5MM 33CM MD/LG  [employer]  DIV OF J AND J [id number] N/A 1 Implanted   SEAL HEMO ABS LILO AH PWDR 3GM - [id number] Implant SEAL HEMO ABS LILO AH PWDR 3GM  [employer] [id number] N/A 1 Implanted       Specimen:          Specimens     ID Source Type Tests Collected By Collected At Frozen?    A Gallbladder Tissue · TISSUE PATHOLOGY EXAM   Norm Mendenhall MD 9/14/21 9577           Findings: gallbladder encased in fatty omentum, gallstones    Complications: none, immediately    Description of Procedure: After obtaining informed consent in the pre-op holding area, the patient was brought to the operating room and placed in the supine position. SCDs were applied, and pre-operative antibiotics were administered. The patient then underwent uncomplicated induction of general endotracheal anesthesia and the abdomen was prepped and draped in the usual sterile fashion. After a brief timeout, the procedure  began.  We began by infiltrating local anesthesia above the umbilicus, and made a skin incision. The umbilical stalk was grasped and used to elevate the fascia which was incised. The abdomen was entered bluntly. On sweep of the abdomen, there was no bowel in the vicinity of entry. 0 vicryl sutures were placed on either side of the fascia and a Stinson trochar was introduced into the abdomen. Pneumoperitoneum was raised to 15 mmHg. Additional working trochars were placed in the epigastrium and right upper quadrant. The patient was then placed in a head-up and right side elevated position. The gallbladder was noted to be encased in fatty omentum which was swept away. The fundus of the gallbladder was then grasped and retracted over the liver. The infundibulum was grasped and retracted towards the right lower quadrant. This maneuver exposed the triangle of Calot. The peritoneum overlying this triangle was incised and using blunt dissection, the cystic duct and cystic artery were circumferentially cleared of the surrounding tissue. This gave the critical view of safety, in which two and only two structures could be seen arising from below and inserting onto the gallbladder, and between them could be seen a small portion of the cystic plate. The cystic duct and cystic artery were then doubly clipped and divided. The gallbladder was then dissected free from the cystic plate using electrocautery to maintain hemostasis. With the gallbladder now completely free, it was placed in an Endo-catch bag and removed through the umbilicus. We returned to the abdomen where hemostasis was visualized. We irrigated the cystic plate with copious amounts of warm normal saline until clear. In order to ensure hemostasis, the gallbladder fossa was covered with Nabil to aid with hemostasis. The additional working trochars were removed under direct visualization to ensure no on-going bleeding. Pneumoperitoneum was released. The fascia of the  umbilical trochar was then closed using the previously placed 0 vicryl suture. The skin was closed using 4-0 Monocryl. The skin was dressed with skin glue. The patient was then extubated and taken to PACU in satisfactory condition.    Norm Mendenhall MD     Date: 9/14/2021  Time: 19:05 EDT

## 2021-09-15 VITALS
WEIGHT: 239.64 LBS | OXYGEN SATURATION: 95 % | BODY MASS INDEX: 33.55 KG/M2 | TEMPERATURE: 98.4 F | DIASTOLIC BLOOD PRESSURE: 91 MMHG | RESPIRATION RATE: 20 BRPM | SYSTOLIC BLOOD PRESSURE: 145 MMHG | HEART RATE: 74 BPM | HEIGHT: 71 IN

## 2021-09-15 LAB
ALBUMIN SERPL-MCNC: 3.5 G/DL (ref 3.5–5.2)
ALBUMIN/GLOB SERPL: 1.3 G/DL
ALP SERPL-CCNC: 270 U/L (ref 39–117)
ALT SERPL W P-5'-P-CCNC: 343 U/L (ref 1–41)
ANION GAP SERPL CALCULATED.3IONS-SCNC: 12 MMOL/L (ref 5–15)
AST SERPL-CCNC: 145 U/L (ref 1–40)
BACTERIA SPEC AEROBE CULT: NO GROWTH
BASOPHILS # BLD AUTO: 0 10*3/MM3 (ref 0–0.2)
BASOPHILS NFR BLD AUTO: 0.5 % (ref 0–1.5)
BILIRUB SERPL-MCNC: 2.1 MG/DL (ref 0–1.2)
BUN SERPL-MCNC: 12 MG/DL (ref 8–23)
BUN/CREAT SERPL: 13.2 (ref 7–25)
CALCIUM SPEC-SCNC: 8.2 MG/DL (ref 8.6–10.5)
CHLORIDE SERPL-SCNC: 102 MMOL/L (ref 98–107)
CO2 SERPL-SCNC: 21 MMOL/L (ref 22–29)
CREAT SERPL-MCNC: 0.91 MG/DL (ref 0.76–1.27)
DEPRECATED RDW RBC AUTO: 45.5 FL (ref 37–54)
EOSINOPHIL # BLD AUTO: 0 10*3/MM3 (ref 0–0.4)
EOSINOPHIL NFR BLD AUTO: 0.1 % (ref 0.3–6.2)
ERYTHROCYTE [DISTWIDTH] IN BLOOD BY AUTOMATED COUNT: 14.7 % (ref 12.3–15.4)
GFR SERPL CREATININE-BSD FRML MDRD: 84 ML/MIN/1.73
GLOBULIN UR ELPH-MCNC: 2.8 GM/DL
GLUCOSE SERPL-MCNC: 122 MG/DL (ref 65–99)
HCT VFR BLD AUTO: 40.8 % (ref 37.5–51)
HGB BLD-MCNC: 13.9 G/DL (ref 13–17.7)
LIPASE SERPL-CCNC: 20 U/L (ref 13–60)
LYMPHOCYTES # BLD AUTO: 0.7 10*3/MM3 (ref 0.7–3.1)
LYMPHOCYTES NFR BLD AUTO: 7.4 % (ref 19.6–45.3)
MAGNESIUM SERPL-MCNC: 2 MG/DL (ref 1.6–2.4)
MCH RBC QN AUTO: 30.4 PG (ref 26.6–33)
MCHC RBC AUTO-ENTMCNC: 34 G/DL (ref 31.5–35.7)
MCV RBC AUTO: 89.5 FL (ref 79–97)
MONOCYTES # BLD AUTO: 0.9 10*3/MM3 (ref 0.1–0.9)
MONOCYTES NFR BLD AUTO: 9.7 % (ref 5–12)
NEUTROPHILS NFR BLD AUTO: 7.3 10*3/MM3 (ref 1.7–7)
NEUTROPHILS NFR BLD AUTO: 82.3 % (ref 42.7–76)
NRBC BLD AUTO-RTO: 0.1 /100 WBC (ref 0–0.2)
PLATELET # BLD AUTO: 168 10*3/MM3 (ref 140–450)
PMV BLD AUTO: 7.8 FL (ref 6–12)
POTASSIUM SERPL-SCNC: 4.1 MMOL/L (ref 3.5–5.2)
PROT SERPL-MCNC: 6.3 G/DL (ref 6–8.5)
RBC # BLD AUTO: 4.56 10*6/MM3 (ref 4.14–5.8)
SODIUM SERPL-SCNC: 135 MMOL/L (ref 136–145)
WBC # BLD AUTO: 8.9 10*3/MM3 (ref 3.4–10.8)

## 2021-09-15 PROCEDURE — 99217 PR OBSERVATION CARE DISCHARGE MANAGEMENT: CPT | Performed by: FAMILY MEDICINE

## 2021-09-15 PROCEDURE — 83735 ASSAY OF MAGNESIUM: CPT | Performed by: SURGERY

## 2021-09-15 PROCEDURE — 80053 COMPREHEN METABOLIC PANEL: CPT | Performed by: SURGERY

## 2021-09-15 PROCEDURE — G0378 HOSPITAL OBSERVATION PER HR: HCPCS

## 2021-09-15 PROCEDURE — 83690 ASSAY OF LIPASE: CPT | Performed by: SURGERY

## 2021-09-15 PROCEDURE — 99024 POSTOP FOLLOW-UP VISIT: CPT | Performed by: SURGERY

## 2021-09-15 PROCEDURE — 85025 COMPLETE CBC W/AUTO DIFF WBC: CPT | Performed by: SURGERY

## 2021-09-15 RX ORDER — HYDROCODONE BITARTRATE AND ACETAMINOPHEN 5; 325 MG/1; MG/1
1 TABLET ORAL EVERY 4 HOURS PRN
Qty: 30 TABLET | Refills: 0 | Status: SHIPPED | OUTPATIENT
Start: 2021-09-15 | End: 2021-09-24

## 2021-09-15 RX ORDER — PANTOPRAZOLE SODIUM 40 MG/1
40 TABLET, DELAYED RELEASE ORAL EVERY MORNING
Status: DISCONTINUED | OUTPATIENT
Start: 2021-09-16 | End: 2021-09-15 | Stop reason: HOSPADM

## 2021-09-15 RX ORDER — QUETIAPINE FUMARATE 25 MG/1
25 TABLET, FILM COATED ORAL NIGHTLY
Status: DISCONTINUED | OUTPATIENT
Start: 2021-09-15 | End: 2021-09-15 | Stop reason: HOSPADM

## 2021-09-15 RX ORDER — DULOXETIN HYDROCHLORIDE 30 MG/1
60 CAPSULE, DELAYED RELEASE ORAL DAILY
Status: DISCONTINUED | OUTPATIENT
Start: 2021-09-15 | End: 2021-09-15 | Stop reason: HOSPADM

## 2021-09-15 RX ORDER — BUPROPION HYDROCHLORIDE 150 MG/1
300 TABLET ORAL DAILY
Status: DISCONTINUED | OUTPATIENT
Start: 2021-09-15 | End: 2021-09-15 | Stop reason: HOSPADM

## 2021-09-15 RX ORDER — ARIPIPRAZOLE 5 MG/1
5 TABLET ORAL DAILY
Status: DISCONTINUED | OUTPATIENT
Start: 2021-09-15 | End: 2021-09-15 | Stop reason: HOSPADM

## 2021-09-15 RX ADMIN — HYDROCODONE BITARTRATE AND ACETAMINOPHEN 2 TABLET: 5; 325 TABLET ORAL at 15:09

## 2021-09-15 RX ADMIN — PANTOPRAZOLE SODIUM 40 MG: 40 INJECTION, POWDER, FOR SOLUTION INTRAVENOUS at 05:37

## 2021-09-15 RX ADMIN — ARIPIPRAZOLE 5 MG: 5 TABLET ORAL at 09:58

## 2021-09-15 RX ADMIN — DULOXETINE 60 MG: 30 CAPSULE, DELAYED RELEASE ORAL at 09:58

## 2021-09-15 RX ADMIN — Medication 10 ML: at 10:00

## 2021-09-15 RX ADMIN — SODIUM CHLORIDE 100 ML/HR: 9 INJECTION, SOLUTION INTRAVENOUS at 05:30

## 2021-09-15 RX ADMIN — HYDROCODONE BITARTRATE AND ACETAMINOPHEN 2 TABLET: 5; 325 TABLET ORAL at 05:29

## 2021-09-15 RX ADMIN — HYDROCODONE BITARTRATE AND ACETAMINOPHEN 2 TABLET: 5; 325 TABLET ORAL at 09:58

## 2021-09-15 RX ADMIN — BUPROPION HYDROCHLORIDE 300 MG: 150 TABLET, EXTENDED RELEASE ORAL at 09:58

## 2021-09-15 NOTE — PROGRESS NOTES
GENERAL SURGERY PROGRESS NOTE    9/15/2021   LOS: 1 day   Patient Care Team:  Damon Bradford MD as PCP - General (Family Medicine)    CHOLECYSTECTOMY LAPAROSCOPIC  9/14/2021  Chief Complaint: cholelithiasis    Subjective   HPI: Patient is a 66-year-old gentleman who was found to have an elevation of his LFTs and bilirubin, and concern for choledocholithiasis.  MRCP verified there was no choledocholithiasis, but there was cholelithiasis, the patient was taken to the operating room on 9/14/2021 for laparoscopic cholecystectomy.    Interval History:   Overall doing well. No nausea/vomiting with eating today.  Pain is well controlled.  Ambulating well.    Objective     Vital Signs  Temp:  [97.2 °F (36.2 °C)-99.4 °F (37.4 °C)] 98.4 °F (36.9 °C)  Heart Rate:  [] 74  Resp:  [10-20] 20  BP: (124-160)/(84-98) 145/91    Physical Exam  Vitals reviewed.   Constitutional:       General: He is not in acute distress.     Appearance: He is obese. He is not ill-appearing.   Cardiovascular:      Rate and Rhythm: Normal rate and regular rhythm.   Pulmonary:      Effort: Pulmonary effort is normal. No respiratory distress.   Abdominal:      Comments: Soft, distended, appropriately tender, incisions are well approximate without any surrounding erythema, induration, or drainage to suggest infection.   Musculoskeletal:         General: No swelling. Normal range of motion.   Skin:     General: Skin is warm and dry.   Neurological:      General: No focal deficit present.      Mental Status: He is alert and oriented to person, place, and time.   Psychiatric:         Mood and Affect: Mood normal.         Behavior: Behavior normal.         Thought Content: Thought content normal.         Judgment: Judgment normal.          Results Review:    Lab Results (last 24 hours)     Procedure Component Value Units Date/Time    Urine Culture - Urine, Urine, Clean Catch [[id number]]  (Normal) Collected: 09/13/21 1653    Specimen: Urine, Clean  Catch Updated: 09/15/21 1236     Urine Culture No growth    Tissue Pathology Exam [[id number]] Collected: 09/14/21 1837    Specimen: Tissue from Gallbladder Updated: 09/15/21 0834    Comprehensive Metabolic Panel [[id number]]  (Abnormal) Collected: 09/15/21 0253    Specimen: Blood Updated: 09/15/21 0335     Glucose 122 mg/dL      BUN 12 mg/dL      Creatinine 0.91 mg/dL      Sodium 135 mmol/L      Potassium 4.1 mmol/L      Chloride 102 mmol/L      CO2 21.0 mmol/L      Calcium 8.2 mg/dL      Total Protein 6.3 g/dL      Albumin 3.50 g/dL      ALT (SGPT) 343 U/L      AST (SGOT) 145 U/L      Alkaline Phosphatase 270 U/L      Total Bilirubin 2.1 mg/dL      eGFR Non African Amer 84 mL/min/1.73      Globulin 2.8 gm/dL      A/G Ratio 1.3 g/dL      BUN/Creatinine Ratio 13.2     Anion Gap 12.0 mmol/L     Narrative:      GFR Normal >60  Chronic Kidney Disease <60  Kidney Failure <15      Lipase [[id number]]  (Normal) Collected: 09/15/21 0253    Specimen: Blood Updated: 09/15/21 0335     Lipase 20 U/L     Magnesium [[id number]]  (Normal) Collected: 09/15/21 0253    Specimen: Blood Updated: 09/15/21 0335     Magnesium 2.0 mg/dL     CBC & Differential [[id number]]  (Abnormal) Collected: 09/15/21 0253    Specimen: Blood Updated: 09/15/21 0318    Narrative:      The following orders were created for panel order CBC & Differential.  Procedure                               Abnormality         Status                     ---------                               -----------         ------                     CBC Auto Differential[[id number]]        Abnormal            Final result                 Please view results for these tests on the individual orders.    CBC Auto Differential [[id number]]  (Abnormal) Collected: 09/15/21 0253    Specimen: Blood Updated: 09/15/21 0318     WBC 8.90 10*3/mm3      RBC 4.56 10*6/mm3      Hemoglobin 13.9 g/dL      Hematocrit 40.8 %      MCV 89.5 fL      MCH 30.4 pg      MCHC 34.0 g/dL      RDW 14.7 %      RDW-SD  45.5 fl      MPV 7.8 fL      Platelets 168 10*3/mm3      Neutrophil % 82.3 %      Lymphocyte % 7.4 %      Monocyte % 9.7 %      Eosinophil % 0.1 %      Basophil % 0.5 %      Neutrophils, Absolute 7.30 10*3/mm3      Lymphocytes, Absolute 0.70 10*3/mm3      Monocytes, Absolute 0.90 10*3/mm3      Eosinophils, Absolute 0.00 10*3/mm3      Basophils, Absolute 0.00 10*3/mm3      nRBC 0.1 /100 WBC         Imaging Results (Last 24 Hours)     ** No results found for the last 24 hours. **           I have reviewed the above results and noted them below    Medication Review:    Current Facility-Administered Medications:   •  ARIPiprazole (ABILIFY) tablet 5 mg, 5 mg, Oral, Daily, Erasto Grijalva MD, 5 mg at 09/15/21 0958  •  buPROPion XL (WELLBUTRIN XL) 24 hr tablet 300 mg, 300 mg, Oral, Daily, Erasto Grijalva MD, 300 mg at 09/15/21 0958  •  DULoxetine (CYMBALTA) DR capsule 60 mg, 60 mg, Oral, Daily, Erasto Grijalva MD, 60 mg at 09/15/21 0958  •  HYDROcodone-acetaminophen (NORCO) 5-325 MG per tablet 1 tablet, 1 tablet, Oral, Q4H PRN **OR** HYDROcodone-acetaminophen (NORCO) 5-325 MG per tablet 2 tablet, 2 tablet, Oral, Q4H PRN, Norm Mendenhall MD, 2 tablet at 09/15/21 0958  •  Magnesium Sulfate 2 gram infusion - Mg less than or equal to 1.5 mg/dL, 2 g, Intravenous, PRN **OR** Magnesium Sulfate 1 gram infusion - Mg 1.6-1.9 mg/dL, 1 g, Intravenous, PRN, Norm Mendenhall MD  •  metoclopramide (REGLAN) injection 10 mg, 10 mg, Intravenous, Q6H PRN, Norm Mendenhall MD  •  nitroglycerin (NITROSTAT) SL tablet 0.4 mg, 0.4 mg, Sublingual, Q5 Min PRN, Norm Mendenhall MD  •  ondansetron (ZOFRAN) tablet 4 mg, 4 mg, Oral, Q6H PRN **OR** ondansetron (ZOFRAN) injection 4 mg, 4 mg, Intravenous, Q6H PRN, Norm Mendenhall MD  •  [START ON 9/16/2021] pantoprazole (PROTONIX) EC tablet 40 mg, 40 mg, Oral, Rudi CORTEZ Timothy Michael, MD  •  potassium chloride (K-DUR,KLOR-CON) CR  tablet 40 mEq, 40 mEq, Oral, PRN, Norm Mendenhall MD  •  QUEtiapine (SEROquel) tablet 25 mg, 25 mg, Oral, Nightly, Erasto Grijalva MD  •  sodium chloride 0.9 % flush 10 mL, 10 mL, Intravenous, PRN, Norm Mendenhall MD  •  sodium chloride 0.9 % flush 10 mL, 10 mL, Intravenous, Q12H, Norm Mendenhall MD, 10 mL at 09/15/21 1000  •  sodium chloride 0.9 % flush 10 mL, 10 mL, Intravenous, PRN, Norm Mendenhall MD    Assessment/Plan     Principal Problem:    Abdominal pain  Active Problems:    Severe recurrent major depression with psychotic features (CMS/HCC)    Gastroesophageal reflux disease without esophagitis    Benign prostatic hyperplasia without lower urinary tract symptoms    Acute cystitis    Sleep apnea    Elevated LFTs    Hyperbilirubinemia    Thrombocytopenia (CMS/HCC)    Diverticulosis    Hiatal hernia    Disorder of cecum    Nephrocalcinosis    Multiple renal cysts    Obesity (BMI 30.0-34.9)    LFTs continue to trend down.  Ok for discharge  Follow-up with me (Dr. Mendenhall) in 2 weeks  Regular diet as tolerated  Ok to shower starting tomorrow. No tub baths, pools, lakes, or streams for 1 week.  Ok to apply ice to incisions  No heavy lifting (greater than 20 lbs) for 6 weeks after surgery  Call my office or present to the ED with: fevers greater than 101.5, redness around the incisions, drainage from the incisions, intractable nausea/vomiting, or pain that is getting worse instead of better    Plan for disposition: as above    Norm Mendenhall MD  09/15/21  14:00 EDT

## 2021-09-15 NOTE — PLAN OF CARE
Problem: Adult Inpatient Plan of Care  Goal: Plan of Care Review  Outcome: Ongoing, Progressing  Flowsheets (Taken 9/14/2021 2142)  Progress: improving  Plan of Care Reviewed With: patient  Goal: Patient-Specific Goal (Individualized)  Outcome: Ongoing, Progressing  Goal: Absence of Hospital-Acquired Illness or Injury  Outcome: Ongoing, Progressing  Intervention: Identify and Manage Fall Risk  Description: Perform standard risk assessment with a validated tool or comprehensive approach appropriate to the patient on admission; reassess fall risk frequently, with change in status or transfer to another level of care.  Communicate fall injury risk to interprofessional healthcare team.  Determine need for increased observation, equipment and environmental modification, such as low bed and signage, as well as supportive, nonskid footwear.  Adjust safety measures to individual developmental age, stage and identified risk factors.  Reinforce the importance of safety and physical activity with patient and family.  Perform regular intentional rounding to assess need for position change, pain assessment, personal needs, including assistance with toileting.  Recent Flowsheet Documentation  Taken 9/14/2021 2136 by Dhaval Dominguez, RN  Safety Promotion/Fall Prevention: safety round/check completed  Taken 9/14/2021 2022 by Dhaval Dominguez, RN  Safety Promotion/Fall Prevention: safety round/check completed  Taken 9/14/2021 1931 by Dhaval Dominguez, RN  Safety Promotion/Fall Prevention: patient off unit  Goal: Optimal Comfort and Wellbeing  Outcome: Ongoing, Progressing  Intervention: Provide Person-Centered Care  Description: Use a family-focused approach to care.  Develop trust and rapport by proactively providing information, encouraging questions, addressing concerns and offering reassurance.  Acknowledge emotional response to hospitalization.  Recognize and utilize personal coping strategies.  Honor spiritual and  cultural preferences.  Recent Flowsheet Documentation  Taken 9/14/2021 2136 by Dhaval Dominguez, RN  Trust Relationship/Rapport: care explained  Goal: Readiness for Transition of Care  Outcome: Ongoing, Progressing     Problem: Pain Acute  Goal: Optimal Pain Control  Outcome: Ongoing, Progressing     Problem: Bleeding (Surgery Nonspecified)  Goal: Absence of Bleeding  Outcome: Ongoing, Progressing     Problem: Bowel Elimination Impaired (Surgery Nonspecified)  Goal: Effective Bowel Elimination  Outcome: Ongoing, Progressing     Problem: Infection (Surgery Nonspecified)  Goal: Absence of Infection Signs and Symptoms  Outcome: Ongoing, Progressing     Problem: Ongoing Anesthesia Effects (Surgery Nonspecified)  Goal: Anesthesia/Sedation Recovery  Outcome: Ongoing, Progressing  Intervention: Optimize Anesthesia Recovery  Description: Maintain patent airway; position to minimize risk of obstruction and aspiration.  Monitor respiratory status for signs of hypoventilation; provide oxygen therapy judiciously if hypoxemia present.  Monitor level of consciousness and response to verbal and physical stimulation; protect areas of decreased sensation (e.g., anatomical positioning, heat and cold avoidance, medical device or object positioning).  Individualize frequency and intensity of monitoring based on sedation or anesthesia administered, patient risk factors, ongoing assessment and organizational protocol.  Optimize fluid balance; anticipate need for fluid resuscitation.  Prepare for administration of pharmacologic therapy to manage sedation or anesthesia effects (e.g., antiemetic, reversal agent).  Consider risk for alterations in behavior or cognition; offer reassurance; answer questions.  Adjust environment to maintain safety (e.g., fall precautions, safety equipment).  Recent Flowsheet Documentation  Taken 9/14/2021 2136 by Dhaval Dominguez, RN  Safety Promotion/Fall Prevention: safety round/check completed  Taken  9/14/2021 2022 by Dhaval Dominguez, RN  Safety Promotion/Fall Prevention: safety round/check completed  Taken 9/14/2021 1931 by Dhaval Dominguez, RN  Safety Promotion/Fall Prevention: patient off unit     Problem: Pain (Surgery Nonspecified)  Goal: Acceptable Pain Control  Outcome: Ongoing, Progressing     Problem: Postoperative Nausea and Vomiting (Surgery Nonspecified)  Goal: Nausea and Vomiting Relief  Outcome: Ongoing, Progressing     Problem: Postoperative Urinary Retention (Surgery Nonspecified)  Goal: Effective Urinary Elimination  Outcome: Ongoing, Progressing   Goal Outcome Evaluation:  Plan of Care Reviewed With: patient        Progress: improving

## 2021-09-15 NOTE — PLAN OF CARE
Problem: Adult Inpatient Plan of Care  Goal: Plan of Care Review  Outcome: Ongoing, Progressing  Flowsheets (Taken 9/15/2021 9639)  Progress: improving  Plan of Care Reviewed With: patient  Outcome Summary: pt feels much better being discharge  Goal: Patient-Specific Goal (Individualized)  Outcome: Ongoing, Progressing  Goal: Absence of Hospital-Acquired Illness or Injury  Outcome: Ongoing, Progressing  Goal: Optimal Comfort and Wellbeing  Outcome: Ongoing, Progressing  Goal: Readiness for Transition of Care  Outcome: Ongoing, Progressing   Goal Outcome Evaluation:  Plan of Care Reviewed With: patient        Progress: improving  Outcome Summary: pt feels much better being discharge

## 2021-09-15 NOTE — CASE MANAGEMENT/SOCIAL WORK
Continued Stay Note  MERLE Astudillo     Patient Name: Chilo Hurt  MRN: [id number]  Today's Date: 9/15/2021    Admit Date: 9/13/2021    Discharge Plan     Row Name 09/15/21 1311       Plan    Plan  D/C Plan: Home with family.    Plan Comments  Barrier to D/C: POD#1 lap lita, IV abx.          Expected Discharge Date and Time     Expected Discharge Date Expected Discharge Time    Sep 16, 2021             Chelsea Rosas

## 2021-09-15 NOTE — CASE MANAGEMENT/SOCIAL WORK
Case Management Discharge Note      Final Note: Home with family         Selected Continued Care - Discharged on 9/15/2021 Admission date: 9/13/2021 - Discharge disposition: Home or Self Care        Final Discharge Disposition Code: 01 - home or self-care

## 2021-09-15 NOTE — DISCHARGE INSTRUCTIONS
Ok for discharge  Follow-up with me (Dr. Mendenhall) in 2 weeks  Regular diet as tolerated  Ok to shower starting tomorrow. No tub baths, pools, lakes, or streams for 1 week.  Ok to apply ice to incisions  No heavy lifting (greater than 20 lbs) for 6 weeks after surgery  Call my office or present to the ED with: fevers greater than 101.5, redness around the incisions, drainage from the incisions, intractable nausea/vomiting, or pain that is getting worse instead of better    Minimally Invasive Cholecystectomy  Minimally invasive cholecystectomy is surgery to remove the gallbladder. The gallbladder is a pear-shaped organ that lies beneath the liver on the right side of the body. The gallbladder stores bile, which is a fluid that helps the body digest fats. Cholecystectomy is often done to treat inflammation of the gallbladder (cholecystitis). This condition is usually caused by a buildup of gallstones (cholelithiasis) in the gallbladder. Gallstones can block the flow of bile, which can result in inflammation and pain. In severe cases, emergency surgery may be required.  This procedure is done though small incisions in the abdomen, instead of one large incision. It is also called laparoscopic surgery. A thin scope with a camera (laparoscope) is inserted through one incision. Then surgical instruments are inserted through the other incisions. In some cases, a minimally invasive surgery may need to be changed to a surgery that is done through a larger incision. This is called open surgery.  Tell a health care provider about:  · Any allergies you have.  · All medicines you are taking, including vitamins, herbs, eye drops, creams, and over-the-counter medicines.  · Any problems you or family members have had with anesthetic medicines.  · Any blood disorders you have.  · Any surgeries you have had.  · Any medical conditions you have.  · Whether you are pregnant or may be pregnant.  What are the risks?  Generally, this is a  safe procedure. However, problems may occur, including:  · Infection.  · Bleeding.  · Allergic reactions to medicines.  · Damage to nearby structures or organs.  · A stone remaining in the common bile duct. The common bile duct carries bile from the gallbladder into the small intestine.  · A bile leak from the cyst duct that is clipped when your gallbladder is removed.  What happens before the procedure?  Staying hydrated  Follow instructions from your health care provider about hydration, which may include:  · Up to 2 hours before the procedure - you may continue to drink clear liquids, such as water, clear fruit juice, black coffee, and plain tea.    Eating and drinking restrictions  Follow instructions from your health care provider about eating and drinking, which may include:  · 8 hours before the procedure - stop eating heavy meals or foods, such as meat, fried foods, or fatty foods.  · 6 hours before the procedure - stop eating light meals or foods, such as toast or cereal.  · 6 hours before the procedure - stop drinking milk or drinks that contain milk.  · 2 hours before the procedure - stop drinking clear liquids.  Medicines  Ask your health care provider about:  · Changing or stopping your regular medicines. This is especially important if you are taking diabetes medicines or blood thinners.  · Taking medicines such as aspirin and ibuprofen. These medicines can thin your blood. Do not take these medicines unless your health care provider tells you to take them.  · Taking over-the-counter medicines, vitamins, herbs, and supplements.  General instructions  · Let your health care provider know if you develop a cold or an infection before surgery.  · Plan to have someone take you home from the hospital or clinic.  · If you will be going home right after the procedure, plan to have someone with you for 24 hours.  · Ask your health care provider:  ? How your surgery site will be marked.  ? What steps will be  taken to help prevent infection. These may include:  § Removing hair at the surgery site.  § Washing skin with a germ-killing soap.  § Taking antibiotic medicine.  What happens during the procedure?    · An IV will be inserted into one of your veins.  · You will be given one or both of the following:  ? A medicine to help you relax (sedative).  ? A medicine to make you fall asleep (general anesthetic).  · A breathing tube will be placed in your mouth.  · Your surgeon will make several small incisions in your abdomen.  · The laparoscope will be inserted through one of the small incisions. The camera on the laparoscope will send images to a monitor in the operating room. This lets your surgeon see inside your abdomen.  · A gas will be pumped into your abdomen. This will expand your abdomen to give the surgeon more room to perform the surgery.  · Other tools that are needed for the procedure will be inserted through the other incisions. The gallbladder will be removed through one of the incisions.  · Your common bile duct may be examined. If stones are found in the common bile duct, they may be removed.  · After your gallbladder has been removed, the incisions will be closed with stitches (sutures), staples, or skin glue.  · Your incisions may be covered with a bandage (dressing).  The procedure may vary among health care providers and hospitals.  What happens after the procedure?  · Your blood pressure, heart rate, breathing rate, and blood oxygen level will be monitored until you leave the hospital or clinic.  · You will be given medicines as needed to control your pain.  · If you were given a sedative during the procedure, it can affect you for several hours. Do not drive or operate machinery until your health care provider says that it is safe.  Summary  · Minimally invasive cholecystectomy, also called laparoscopic cholecystectomy, is surgery to remove the gallbladder using small incisions.  · Tell your health  care provider about all the medical conditions you have and all the medicines you are taking for those conditions.  · Before the procedure, follow instructions about eating or drinking restrictions and changing or stopping medicines.  · If you were given a sedative during the procedure, it can affect you for several hours. Do not drive or operate machinery until your health care provider says that it is safe.  This information is not intended to replace advice given to you by your health care provider. Make sure you discuss any questions you have with your health care provider.  Document Revised: 09/21/2020 Document Reviewed: 09/21/2020  [employer] Patient Education © 2021 [employer]    Minimally Invasive Cholecystectomy, Care After  This sheet gives you information about how to care for yourself after your procedure. Your health care provider may also give you more specific instructions. If you have problems or questions, contact your health care provider.  What can I expect after the procedure?  After the procedure, it is common to have:  · Pain at your incision sites. You will be given medicines to control this pain.  · Mild nausea or vomiting.  · Bloating and possible shoulder pain from the gas that was used during the procedure.  Follow these instructions at home:  Medicines  · Take over-the-counter and prescription medicines only as told by your health care provider.  · If you were prescribed an antibiotic medicine, take or use it as told by your health care provider. Do not stop using the antibiotic even if you start to feel better.  · Ask your health care provider if the medicine prescribed to you:  ? Requires you to avoid driving or using machinery.  ? Can cause constipation. You may need to take these actions to prevent or treat constipation:  § Drink enough fluid to keep your urine pale yellow.  § Take over-the-counter or prescription medicines.  § Eat foods that are high in fiber, such as beans, whole  grains, and fresh fruits and vegetables.  § Limit foods that are high in fat and processed sugars, such as fried or sweet foods.  Incision care    · Follow instructions from your health care provider about how to take care of your incisions. Make sure you:  ? Wash your hands with soap and water for at least 20 seconds before and after you change your bandage (dressing). If soap and water are not available, use hand .  ? Change your dressing as told by your health care provider.  ? Leave stitches (sutures), skin glue, or adhesive strips in place. These skin closures may need to be in place for 2 weeks or longer. If adhesive strip edges start to loosen and curl up, you may trim the loose edges. Do not remove adhesive strips completely unless your health care provider tells you to do that.  · Do not take baths, swim, or use a hot tub until your health care provider approves. Ask your health care provider if you may take showers. You may only be allowed to take sponge baths.  · Check your incision area every day for signs of infection. Check for:  ? More redness, swelling, or pain.  ? Fluid or blood.  ? Warmth.  ? Pus or a bad smell.    Activity  · Rest as told by your health care provider.  · Avoid sitting for a long time without moving. Get up to take short walks every 1-2 hours. This is important to improve blood flow and breathing. Ask for help if you feel weak or unsteady.  · Do not lift anything that is heavier than 10 lb (4.5 kg), or the limit that you are told, until your health care provider says that it is safe.  · Do not play contact sports until your health care provider approves.  · Do not return to work or school until your health care provider approves.  · Return to your normal activities as told by your health care provider. Ask your health care provider what activities are safe for you.  General instructions  · If you were given a sedative during the procedure, it can affect you for several  hours. Do not drive or operate machinery until your health care provider says that it is safe.  · Keep all follow-up visits as told by your health care provider. This is important.  Contact a health care provider if:  · You develop a rash.  · You have more redness, swelling, or pain around your incisions.  · You have fluid or blood coming from your incisions.  · Your incisions feel warm to the touch.  · You have pus or a bad smell coming from your incisions.  · You have a fever.  · One or more of your incisions breaks open.  Get help right away if:  · You have trouble breathing.  · You have chest pain.  · You have increasing pain in your shoulders.  · You faint or feel dizzy when you stand.  · You have severe pain in your abdomen.  · You have nausea or vomiting that lasts for more than one day.  · You have leg pain.  Summary  · After your procedure, it is common to have pain at the incision sites. You may also have nausea or bloating.  · Follow your health care provider's instructions about medicine, activity restrictions, and caring for your incision areas. Do not do activities that require a lot of effort.  · Contact a health care provider if you have a fever or other signs of infection, such as more redness, swelling, or pain around the incisions.  · Get help right away if you have chest pain, increasing pain in the shoulders, or trouble breathing.  This information is not intended to replace advice given to you by your health care provider. Make sure you discuss any questions you have with your health care provider.  Document Revised: 09/16/2020 Document Reviewed: 09/16/2020  [employer] Patient Education © 2021 [employer]

## 2021-09-15 NOTE — DISCHARGE SUMMARY
[facility] Medicine Services  DISCHARGE SUMMARY    Patient Name: Chilo Hurt  : 1955  MRN: [id number]    Date of Admission: 2021  Date of Discharge: 9/15/2021  Primary Care Physician: Damon Bradford MD      Presenting Problem:   Nausea [R11.0]  Epigastric pain [R10.13]  Hepatitis [K75.9]  Abdominal pain [R10.9]    Active and Resolved Hospital Problems:  Active Hospital Problems    Diagnosis POA   • **Abdominal pain [R10.9] Yes   • Elevated LFTs [R79.89] Yes   • Hyperbilirubinemia [E80.6] Yes   • Thrombocytopenia (CMS/HCC) [D69.6] Yes   • Diverticulosis [K57.90] Yes   • Hiatal hernia [K44.9] Yes   • Disorder of cecum [K63.9] Yes   • Nephrocalcinosis [E83.59, N29] Yes   • Multiple renal cysts [Q61.02] Not Applicable   • Obesity (BMI 30.0-34.9) [E66.9] Yes   • Acute cystitis [N30.00] Yes   • Severe recurrent major depression with psychotic features (CMS/HCC) [F33.3] Yes   • Gastroesophageal reflux disease without esophagitis [K21.9] Yes   • Benign prostatic hyperplasia without lower urinary tract symptoms [N40.0] Yes   • Sleep apnea [G47.30] Yes      Resolved Hospital Problems    Diagnosis POA   • Cholelithiasis [K80.20] Yes     Abdominal pain-appears most likely due to symptomatic cholelithiasis with transaminitis and hyperbilirubinemia patient may have transient obstruction but MRCP negative for obstruction currently indicating possible passing of stone  -Urine culture no growth stopping antibiotics  -MRCP showing no biliary obstruction  -General surgery consulted for cholecystectomy  -Patient had laparoscopic cholecystectomy on 2021 did well postoperatively, follow-up outpatient pain control per general surgery, tolerated diet  -GI evaluated patient and reviewed MRCP no indication for ERCP currently  -Antiemetics     Transaminitis-with hyperbilirubinemia possibly secondary to transient biliary obstruction but appears to be improving as patient is no  longer jaundiced continues to slowly improve  -Outpatient CMP ordered to follow-up with primary care, follow-up with GI as needed     Hypophosphatemia-mild in nature likely due to decreased p.o. intake recently with abdominal pain will improve with advance diet     Pyuria-versus acute cystitis abdominal pain more likely associated with cholelithiasis  -On antibiotics empirically but urine culture showing no growth, stopped     BPH/depression/GERD-chronic in nature  -Resume home medication as clinically appropriate     BITA-patient on positive pressure ventilation at night  -Can continue     Obesity-BMI of 33  -Lifestyle modification    Hospital Course     Hospital Course:    Chilo Hurt is a 66 y.o. male w/PMH of sleep apnea, palpitations, HTN, tachycardia, ESBL, GERD, anxiety, depression, prostatic urethral stone presents to [facility] due to abdominal pain.  Patient states dull nonradiating epigastric pain has progressively worsened over the last 3 days, no aggravating or alleviating factors noted.  Patient admits to fatigue, dry cough, fever, nausea, vomiting, bloating.  Patient denies chest pain, dyspnea, edema, palpitations, illegal drug use, illegal alcohol use.  As pain did not improve he decided to go to [facility] ED.     Upon arrival to the ED vitals temp 98.5, HR 83, RR 16, /89, O2 sat 95% on room air.  Labs notable for troponin less than 0.010, glucose 140, , K4.5, CO2 24, creatinine 1.19, BUN 16, GFR 62, , , total bili 3.9, lipase 41, WBC 6, Hgb 15.3, platelets 168, hepatitis panel nonreactive.  UA shows trace ketones, nitrite positive, +1 leukocytes, +3 bilirubin.  CT the abdomen pelvis W/0 shows cholelithiasis without definite CT evidence of acute biliary pathology. No acute hepatic abnormality is seen on this limited noncontrast study..Bladder wall thickening. Correlate with urinalysis. Cecum is displaced into the mid abdomen without definite  evidence of bowel obstruction or acute bowel abnormality at this time. Diverticulosis. .Multiple renal cysts including parapelvic cysts with subtle hyperdense attenuation of the medullary pyramids which could indicate nephrocalcinosis..Small hiatal hernia.  ED provider spoke with gastroenterology who requested admission and MRCP, will see patient in a.m.    9/14/2021:Patient Reports abdominal pain has resolved.  No nausea currently.  MRCP showing no signs of obstruction, general surgery consulted for possible cholecystectomy.  Patient spouse reporting patient notably jaundiced with dark urine the last few days which has since improved.  Patient underwent laparoscopic cholecystectomy in the evening 9/14/2021 did well postoperatively.    9/15/2021: Patient doing well having some mild postsurgical pain controlled medication.  No chest pain or shortness of breath tolerating diet.  Liver enzymes improving slowly.  Patient may be discharged home after evaluated by general surgery with follow-up organized with primary care and general surgery.  Repeat blood work to monitor LFTs ordered.  Patient able to be discharged home in good condition with strict return precautions.    DISCHARGE Follow Up Recommendations for labs and diagnostics: Repeat LFTs      Reasons For Change In Medications and Indications for New Medications:    Pain control per general surgeon    Day of Discharge     Vital Signs:  Temp:  [97.2 °F (36.2 °C)-99.4 °F (37.4 °C)] 98.4 °F (36.9 °C)  Heart Rate:  [] 74  Resp:  [10-20] 20  BP: (124-160)/(84-98) 145/91  Flow (L/min):  [2-5] 2    Physical Exam:  Physical Exam     General: Obese elderly male sitting up in bed breathing comfortably on room air no acute distress  HEENT: NC/AT, EOMI, mucosa moist  Heart: Regular, rate controlled  Chest: Normal work of breathing no wheezing  Abdominal: Soft.    Mild distention, periincisional tenderness  Musculoskeletal: Normal ROM.  No edema. No calf  tenderness.  Neurological: AAOx3, no focal deficits  Skin: Skin is warm and dry. No rash  Psychiatric: Normal mood and affect.      Pertinent  and/or Most Recent Results     LAB RESULTS:      Lab 09/15/21  0253 09/14/21  0228 09/13/21  2215 09/13/21  1653   WBC 8.90 4.70  --  6.00   HEMOGLOBIN 13.9 13.3  --  15.3   HEMATOCRIT 40.8 39.4  --  46.6   PLATELETS 168 154  --  168   NEUTROS ABS 7.30* 3.00  --  4.50   LYMPHS ABS 0.70 0.80  --  0.70   MONOS ABS 0.90 0.60  --  0.60   EOS ABS 0.00 0.20  --  0.10   MCV 89.5 89.5  --  88.0   LACTATE  --   --  1.2  --    PROTIME  --   --  10.5  --    APTT  --   --  27.3  --          Lab 09/15/21  0253 09/14/21  0228 09/13/21  2215 09/13/21  1653   SODIUM 135* 137  --  138   POTASSIUM 4.1 3.8  --  4.5   CHLORIDE 102 105  --  104   CO2 21.0* 23.0  --  24.0   ANION GAP 12.0 9.0  --  10.0   BUN 12 14  --  16   CREATININE 0.91 1.10  --  1.19   GLUCOSE 122* 106*  --  140*   CALCIUM 8.2* 8.3*  --  9.5   MAGNESIUM 2.0  --  2.1  --    PHOSPHORUS  --   --  2.3*  --          Lab 09/15/21  0253 09/14/21  0228 09/13/21  1653   TOTAL PROTEIN 6.3 6.1 7.3   ALBUMIN 3.50 3.40* 4.10   GLOBULIN 2.8  --  3.2   ALT (SGPT) 343* 328* 403*   AST (SGOT) 145* 125* 119*   BILIRUBIN 2.1* 3.1* 3.9*   INDIRECT BILIRUBIN  --  0.4  --    BILIRUBIN DIRECT  --  2.7*  --    ALK PHOS 270* 253* 247*   LIPASE 20  --  41         Lab 09/13/21 2215 09/13/21 1653   TROPONIN T  --  <0.010   PROTIME 10.5  --    INR 0.94  --                  Brief Urine Lab Results  (Last result in the past 365 days)      Color   Clarity   Blood   Leuk Est   Nitrite   Protein   CREAT   Urine HCG        09/13/21 1653 [place]  Comment:  Result checked  Clear Negative Small (1+) Positive 30 mg/dL (1+)             Microbiology Results (last 10 days)     Procedure Component Value - Date/Time    COVID PRE-OP / PRE-PROCEDURE SCREENING ORDER (NO ISOLATION) - Swab, Nasopharynx [[id number]]  (Normal) Collected: 09/13/21 2030    Lab Status: Final  result Specimen: Swab from Nasopharynx Updated: 09/13/21 2053    Narrative:      The following orders were created for panel order COVID PRE-OP / PRE-PROCEDURE SCREENING ORDER (NO ISOLATION) - Swab, Nasopharynx.  Procedure                               Abnormality         Status                     ---------                               -----------         ------                     COVID-19,CEPHEID/GARRET/BD...[[id number]]  Normal              Final result                 Please view results for these tests on the individual orders.    COVID-19,CEPHEID/GARRET/BDMAX,COR/LIZA/PAD/PEYTON IN-HOUSE(OR EMERGENT/ADD-ON),NP SWAB IN TRANSPORT MEDIA 3-4 HR TAT, RT-PCR - Swab, Nasopharynx [[id number]]  (Normal) Collected: 09/13/21 2030    Lab Status: Final result Specimen: Swab from Nasopharynx Updated: 09/13/21 2053     COVID19 Not Detected    Narrative:      Fact sheet for providers: https://www.fda.gov/media/406694/download     Fact sheet for patients: https://www.fda.gov/media/744146/download  Fact sheet for providers: https://www.fda.gov/media/249687/download    Fact sheet for patients: https://www.fda.gov/media/663967/download    Test performed by PCR.    Urine Culture - Urine, Urine, Clean Catch [[id number]]  (Normal) Collected: 09/13/21 1653    Lab Status: Final result Specimen: Urine, Clean Catch Updated: 09/15/21 1236     Urine Culture No growth          CT Abdomen Pelvis Without Contrast    Result Date: 9/13/2021  Impression: 1.Cholelithiasis without definite CT evidence of acute biliary pathology. 2.No acute hepatic abnormality is seen on this limited noncontrast study. 3.Bladder wall thickening. Correlate with urinalysis. 4.Cecum is displaced into the mid abdomen without definite evidence of bowel obstruction or acute bowel abnormality at this time. Diverticulosis. 5.Multiple renal cysts including parapelvic cysts with subtle hyperdense attenuation of the medullary pyramids which could indicate nephrocalcinosis. 6.Small  hiatal hernia.    Electronically Signed By-Sundar Mccormick MD On:9/13/2021 7:26 PM This report was finalized on [id number] by  Sundar Mccormick MD.    MRI abdomen w wo contrast mrcp    Result Date: 9/14/2021  Impression:  1. Uncomplicated cholelithiasis. No choledocholithiasis. 2. No abnormal biliary ductal dilation or pancreatic ductal dilation. No biliary stricture. 3. Bilateral renal cysts and small right hepatic cyst. 4. No acute inflammatory changes are seen within the imaged abdomen. 5. Minimal posterior right basilar atelectasis.  Electronically Signed By-Geneva Ibrahim MD On:9/14/2021 1:25 PM This report was finalized on [id number] by  Geneva Ibrahim MD.                  Labs Pending at Discharge:  Pending Labs     Order Current Status    Tissue Pathology Exam In process          Procedures Performed  Procedure(s):  CHOLECYSTECTOMY LAPAROSCOPIC         Consults:   Consults     Date and Time Order Name Status Description    9/14/2021  1:30 PM Inpatient General Surgery Consult Completed     9/13/2021  7:52 PM Hospitalist (on-call MD unless specified) Completed     9/13/2021  7:52 PM Gastroenterology (on-call MD unless specified) Completed             Discharge Details        Discharge Medications      ASK your doctor about these medications      Instructions Start Date   ARIPiprazole 5 MG tablet  Commonly known as: ABILIFY   5 mg, Oral, Daily      buPROPion  MG 24 hr tablet  Commonly known as: WELLBUTRIN XL   300 mg, Oral, Daily      DULoxetine 60 MG capsule  Commonly known as: CYMBALTA   60 mg, Oral, Daily      esomeprazole 20 MG capsule  Commonly known as: nexIUM   20 mg, Oral, Every Morning Before Breakfast      QUEtiapine 50 MG tablet  Commonly known as: SEROquel   25 mg, Oral, Nightly             No Known Allergies      Discharge Disposition: Good      Diet:  Hospital:  Diet Order   Procedures   • Diet Regular         Discharge Activity:         CODE STATUS:  Code Status and Medical  Interventions:   Ordered at: 09/13/21 2138     Code Status:    CPR     Medical Interventions (Level of Support Prior to Arrest):    Full         No future appointments.        Time spent on Discharge including face to face service:  25 minutes    This patient has been examined wearing appropriate Personal Protective Equipment and discussed with hospital infection control department. 09/15/21      Signature: Electronically signed by Erasto Grijalva MD, 09/15/21, 1:43 PM EDT.

## 2021-09-16 LAB
LAB AP CASE REPORT: NORMAL
PATH REPORT.FINAL DX SPEC: NORMAL
PATH REPORT.GROSS SPEC: NORMAL

## 2021-09-29 ENCOUNTER — OFFICE VISIT (OUTPATIENT)
Dept: SURGERY | Facility: CLINIC | Age: 66
End: 2021-09-29

## 2021-09-29 VITALS
HEART RATE: 71 BPM | RESPIRATION RATE: 18 BRPM | TEMPERATURE: 98.2 F | SYSTOLIC BLOOD PRESSURE: 125 MMHG | HEIGHT: 70 IN | WEIGHT: 239.2 LBS | DIASTOLIC BLOOD PRESSURE: 87 MMHG | BODY MASS INDEX: 34.24 KG/M2 | OXYGEN SATURATION: 94 %

## 2021-09-29 DIAGNOSIS — K80.20 CALCULUS OF GALLBLADDER WITHOUT CHOLECYSTITIS WITHOUT OBSTRUCTION: Primary | ICD-10-CM

## 2021-09-29 PROCEDURE — 99024 POSTOP FOLLOW-UP VISIT: CPT | Performed by: SURGERY

## 2021-09-29 RX ORDER — METOPROLOL SUCCINATE 25 MG/1
TABLET, EXTENDED RELEASE ORAL
COMMUNITY
Start: 2021-09-03

## 2021-09-29 RX ORDER — MELOXICAM 15 MG/1
TABLET ORAL
COMMUNITY
Start: 2021-09-20

## 2021-09-29 RX ORDER — OMEPRAZOLE MAGNESIUM 2.5 MG/1
GRANULE, DELAYED RELEASE ORAL
COMMUNITY

## 2021-10-01 NOTE — PROGRESS NOTES
"Post-op Note        Subjective   Chilo Hurt is a 66 y.o. male status post laparoscopic cholecystectomy performed on 9/14/2021 for gallstones.  Overall, the patient is doing quite well.  He denies any nausea, vomiting, bloating with fatty foods, diarrhea, or fevers.  His incisional pain is minimal.  He is returned to his usual activities.      Objective   /87 (BP Location: Right arm, Patient Position: Sitting, Cuff Size: Large Adult)   Pulse 71   Temp 98.2 °F (36.8 °C) (Infrared)   Resp 18   Ht 177.8 cm (70\")   Wt 109 kg (239 lb 3.2 oz)   SpO2 94%   BMI 34.32 kg/m²   Incisions are well-healed without any surrounding erythema, induration, or drainage to suggest infection.      Assessment/Plan   Patient is a 66-year-old gentleman status post laparoscopic cholecystectomy performed on 9/14/2021 for gallstones.    Pathology reviewed with patient which demonstrated acute on chronic cholecystitis with cholelithiasis.  Regular diet as tolerated  No heavy lifting for 6 weeks after surgery  Follow-up with me as needed.    Norm Mendenhall MD  10/1/2021  17:06 EDT  "

## 2023-08-29 ENCOUNTER — TRANSCRIBE ORDERS (OUTPATIENT)
Dept: ADMINISTRATIVE | Facility: HOSPITAL | Age: 68
End: 2023-08-29
Payer: COMMERCIAL

## 2023-08-29 DIAGNOSIS — Z13.6 ENCOUNTER FOR SCREENING FOR CARDIOVASCULAR DISORDERS: ICD-10-CM

## 2023-08-29 DIAGNOSIS — Z13.6 ENCOUNTER FOR SCREENING FOR VASCULAR DISEASE: Primary | ICD-10-CM

## 2023-10-26 ENCOUNTER — HOSPITAL ENCOUNTER (OUTPATIENT)
Dept: CARDIOLOGY | Facility: HOSPITAL | Age: 68
Discharge: HOME | End: 2023-10-26

## 2023-10-26 ENCOUNTER — HOSPITAL ENCOUNTER (OUTPATIENT)
Dept: CT IMAGING | Facility: HOSPITAL | Age: 68
Discharge: HOME | End: 2023-10-26

## 2023-10-26 DIAGNOSIS — Z13.6 ENCOUNTER FOR SCREENING FOR CARDIOVASCULAR DISORDERS: ICD-10-CM

## 2023-10-26 DIAGNOSIS — Z13.6 ENCOUNTER FOR SCREENING FOR VASCULAR DISEASE: ICD-10-CM

## 2023-10-26 LAB
BH CV VAS SCREENING CAROTID CCA LEFT: 95 CM/SEC
BH CV VAS SCREENING CAROTID CCA RIGHT: 62 CM/SEC
BH CV VAS SCREENING CAROTID ICA LEFT: 52 CM/SEC
BH CV VAS SCREENING CAROTID ICA RIGHT: 50 CM/SEC
BH CV XLRA MEAS - MID AO DIAM: 2.1 CM
BH CV XLRA MEAS - PAD LEFT ABI PT: 1.27
BH CV XLRA MEAS - PAD LEFT ARM: 128 MMHG
BH CV XLRA MEAS - PAD LEFT LEG PT: 162 MMHG
BH CV XLRA MEAS - PAD RIGHT ABI PT: 1.24
BH CV XLRA MEAS - PAD RIGHT ARM: 127 MMHG
BH CV XLRA MEAS - PAD RIGHT LEG PT: 159 MMHG
BH CV XLRA MEAS LEFT DIST CCA EDV: -23.6 CM/SEC
BH CV XLRA MEAS LEFT DIST CCA PSV: -95.1 CM/SEC
BH CV XLRA MEAS LEFT ICA/CCA RATIO: 0.5
BH CV XLRA MEAS LEFT PROX ICA EDV: -19.9 CM/SEC
BH CV XLRA MEAS LEFT PROX ICA PSV: -51.6 CM/SEC
BH CV XLRA MEAS RIGHT DIST CCA EDV: -12.4 CM/SEC
BH CV XLRA MEAS RIGHT DIST CCA PSV: -61.5 CM/SEC
BH CV XLRA MEAS RIGHT ICA/CCA RATIO: 0.8
BH CV XLRA MEAS RIGHT PROX ICA EDV: -19.9 CM/SEC
BH CV XLRA MEAS RIGHT PROX ICA PSV: -50.3 CM/SEC

## 2023-10-26 PROCEDURE — 75571 CT HRT W/O DYE W/CA TEST: CPT

## 2023-10-26 PROCEDURE — 93799 UNLISTED CV SVC/PROCEDURE: CPT

## 2024-04-10 ENCOUNTER — OFFICE VISIT (OUTPATIENT)
Dept: SLEEP MEDICINE | Facility: HOSPITAL | Age: 69
End: 2024-04-10
Payer: MEDICARE

## 2024-04-10 VITALS — HEIGHT: 70 IN | HEART RATE: 88 BPM | BODY MASS INDEX: 39.05 KG/M2 | WEIGHT: 272.8 LBS | OXYGEN SATURATION: 94 %

## 2024-04-10 DIAGNOSIS — G47.14 HYPERSOMNIA DUE TO MEDICAL CONDITION: ICD-10-CM

## 2024-04-10 DIAGNOSIS — G47.33 OSA (OBSTRUCTIVE SLEEP APNEA): ICD-10-CM

## 2024-04-10 DIAGNOSIS — G47.10 HYPERSOMNIA WITH SLEEP APNEA: Primary | ICD-10-CM

## 2024-04-10 DIAGNOSIS — E66.01 CLASS 2 SEVERE OBESITY DUE TO EXCESS CALORIES WITH SERIOUS COMORBIDITY AND BODY MASS INDEX (BMI) OF 39.0 TO 39.9 IN ADULT: ICD-10-CM

## 2024-04-10 DIAGNOSIS — G47.30 HYPERSOMNIA WITH SLEEP APNEA: Primary | ICD-10-CM

## 2024-04-10 PROCEDURE — G0463 HOSPITAL OUTPT CLINIC VISIT: HCPCS

## 2024-04-10 NOTE — PROGRESS NOTES
[facility] New Patient/Consultation       Reason for Consultation: BITA    Patient Care Team:  Damon Bradford MD as PCP - General (Family Medicine)  Milad Hurt MD as Consulting Physician (Sleep Medicine)    Chief complaint: BITA    History of present illness:    Thank you for asking me to see your patient.  The patient is a 69 y.o. male who states he was diagnosed with obstructive sleep apnea over 10 years ago.  He did use CPAP for about a year only.  The patient is here to be reevaluated.  The patient goes to bed 11:30 PM and awakens between 7 and 8:30 AM.  He falls asleep within 15 minutes.  He is tired upon awakening.  He will take 1 nap daily.  He has complaints of hypersomnolence and his [place] Sleepiness Scale is abnormal at 15.  He states he has gained 30 pounds in the last several years.  He does snore and he has had witnessed apnea noted.  The patient describes awakening gasping for breath and awakening coughing or choking.  She will have sudden episodes of sleep during the daytime.  He will use the restroom once during the nighttime.    Review of Systems:    A complete review of systems was done and all were negative with the exception of the above.  He takes medicines for SRINIVASA.    History:  Past Medical History:   Diagnosis Date    Depression     GERD (gastroesophageal reflux disease)     BITA (obstructive sleep apnea)     Use CPAP for 1 year prior to 2015    Prostate enlargement     Urinary retention    ,   Past Surgical History:   Procedure Laterality Date    CHOLECYSTECTOMY N/A 9/14/2021    Procedure: CHOLECYSTECTOMY LAPAROSCOPIC;  Surgeon: Norm Mendenhall MD;  Location: [facility] MAIN OR;  Service: General;  Laterality: N/A;    KNEE SURGERY  07/2021    Left   ,   Family History   Problem Relation Age of Onset    Arthritis Mother     Breast cancer Mother     No Known Problems Father     Thyroid disease Other    , and   Social History     Socioeconomic History    Marital  "status:    Tobacco Use    Smoking status: Never    Smokeless tobacco: Never   Vaping Use    Vaping status: Never Used   Substance and Sexual Activity    Alcohol use: No    Drug use: No    Sexual activity: Defer     E-cigarette/Vaping    E-cigarette/Vaping Use Never User      E-cigarette/Vaping Substances     E-cigarette/Vaping Devices      Social History: He is retired.  3-4 caffeinated beverages a day.    Allergies:  Patient has no known allergies.     Medication Review: His list was reviewed.  He does take quetiapine 25 mg 1/2 tablet at bedtime    Vital Signs:    Vitals:    04/10/24 1015   Pulse: 88   SpO2: 94%   Weight: 124 kg (272 lb 12.8 oz)   Height: 177.8 cm (70\")      Body mass index is 39.14 kg/m².  Neck Circumference: 18.5 inches      Physical Exam:    Constitutional:  Well developed 69 y.o. male that appears in no apparent distress.  Awake & oriented times 3.  Normal mood with normal recent and remote memory and normal judgement.  Eyes:  Conjunctivae normal.  Oropharynx: Moist mucous membranes without exudate and a large tongue that is scalloped and class III Mallampati airway and no dentures.    Neck: Trachea midline  Respiratory: Effort is not labored  Cardiovascular: Radial pulse regular  Musculoskeletal: Gait appears normal, no digital clubbing evident, no pre-tibial edema    Impression:   The patient was previously diagnosed with obstructive sleep apnea and previously used CPAP for about a year, greater than 10 years ago.    Presently, patient demonstrates symptoms and signs consistent with obstructive sleep apnea.  Additionally, the patient has complaints of hypersomnolence consistent with untreated obstructive sleep apnea.    Comorbidities include hypertension, depression, and SRINIVASA.    Plan:  Good sleep hygiene measures should be maintained.  Weight loss would be beneficial in this patient who has class II severe obesity by Body mass index is 39.14 kg/m².  The patient's weight 2/12/2021, 252 " pounds.    Pathophysiology of BITA described to the patient.  Cardiovascular complications of untreated BITA also reviewed.  I also described how untreated BITA can affect hypertension as well as affecting SRINIVASA and depression.    After reviewing all with the patient, would recommend and he is agreeable to proceed with a home sleep study.  I described the procedure to him.  Additionally, due to severity of obstructive sleep apnea identified, oral appliance versus auto CPAP may be indicated.  Again, answered all of his questions Home sleep study will be scheduled and further recommendations will be made once those results are known.    Thank you for requesting me to assist in this patient's care.    Milad Hurt MD  Sleep Medicine  04/10/24  10:30 EDT

## 2024-04-19 ENCOUNTER — HOSPITAL ENCOUNTER (OUTPATIENT)
Dept: SLEEP MEDICINE | Facility: HOSPITAL | Age: 69
Discharge: HOME | End: 2024-04-19
Admitting: INTERNAL MEDICINE
Payer: MEDICARE

## 2024-04-19 DIAGNOSIS — G47.30 HYPERSOMNIA WITH SLEEP APNEA: ICD-10-CM

## 2024-04-19 DIAGNOSIS — G47.10 HYPERSOMNIA WITH SLEEP APNEA: ICD-10-CM

## 2024-04-19 PROCEDURE — 95806 SLEEP STUDY UNATT&RESP EFFT: CPT

## 2024-04-20 PROBLEM — E66.01 CLASS 2 SEVERE OBESITY DUE TO EXCESS CALORIES WITH SERIOUS COMORBIDITY AND BODY MASS INDEX (BMI) OF 39.0 TO 39.9 IN ADULT: Status: ACTIVE | Noted: 2024-04-20

## 2024-04-20 PROBLEM — E66.812 CLASS 2 SEVERE OBESITY DUE TO EXCESS CALORIES WITH SERIOUS COMORBIDITY AND BODY MASS INDEX (BMI) OF 39.0 TO 39.9 IN ADULT: Status: ACTIVE | Noted: 2024-04-20

## 2024-04-20 PROBLEM — G47.33 OSA (OBSTRUCTIVE SLEEP APNEA): Status: ACTIVE | Noted: 2018-06-04

## 2024-04-20 PROBLEM — G47.14 HYPERSOMNIA DUE TO MEDICAL CONDITION: Status: ACTIVE | Noted: 2024-04-20

## 2024-04-24 ENCOUNTER — TELEPHONE (OUTPATIENT)
Dept: SLEEP MEDICINE | Facility: HOSPITAL | Age: 69
End: 2024-04-24
Payer: MEDICARE

## 2024-04-24 PROCEDURE — 95806 SLEEP STUDY UNATT&RESP EFFT: CPT | Performed by: INTERNAL MEDICINE

## 2024-04-24 NOTE — TELEPHONE ENCOUNTER
Called pt tried to leave message phone is note set up for voice mail .    After   several weeks, overnight oximetry on room air on auto CPAP will be   obtained for the patient sleep-related hypoxia.  Once set up occurs, the   patient back in 6 weeks to review download to assure compliance and   efficacy.  However, the patient has any problems prior to follow-up, he is   to call the [facility]. Order sent to [facility].

## 2024-05-07 ENCOUNTER — OFFICE VISIT (OUTPATIENT)
Dept: SLEEP MEDICINE | Facility: HOSPITAL | Age: 69
End: 2024-05-07
Payer: MEDICARE

## 2024-05-07 VITALS — HEIGHT: 70 IN | OXYGEN SATURATION: 95 % | HEART RATE: 82 BPM | BODY MASS INDEX: 39.37 KG/M2 | WEIGHT: 275 LBS

## 2024-05-07 DIAGNOSIS — G47.36 HYPOXEMIA ASSOCIATED WITH SLEEP: ICD-10-CM

## 2024-05-07 DIAGNOSIS — G47.14 HYPERSOMNIA DUE TO MEDICAL CONDITION: ICD-10-CM

## 2024-05-07 DIAGNOSIS — G47.33 OSA (OBSTRUCTIVE SLEEP APNEA): Primary | ICD-10-CM

## 2024-05-07 DIAGNOSIS — E66.01 CLASS 2 SEVERE OBESITY DUE TO EXCESS CALORIES WITH SERIOUS COMORBIDITY AND BODY MASS INDEX (BMI) OF 39.0 TO 39.9 IN ADULT: ICD-10-CM

## 2024-05-07 PROCEDURE — 1160F RVW MEDS BY RX/DR IN RCRD: CPT | Performed by: INTERNAL MEDICINE

## 2024-05-07 PROCEDURE — G0463 HOSPITAL OUTPT CLINIC VISIT: HCPCS

## 2024-05-07 PROCEDURE — 1159F MED LIST DOCD IN RCRD: CPT | Performed by: INTERNAL MEDICINE

## 2024-05-07 PROCEDURE — 99213 OFFICE O/P EST LOW 20 MIN: CPT | Performed by: INTERNAL MEDICINE

## 2024-05-13 ENCOUNTER — TELEPHONE (OUTPATIENT)
Dept: SLEEP MEDICINE | Facility: HOSPITAL | Age: 69
End: 2024-05-13
Payer: MEDICARE

## 2024-06-01 ENCOUNTER — DOCUMENTATION (OUTPATIENT)
Dept: SLEEP MEDICINE | Facility: HOSPITAL | Age: 69
End: 2024-06-01
Payer: MEDICARE

## 2024-06-01 NOTE — PROGRESS NOTES
Home sleep study performed 4/19/2024.  Severe obstructive sleep apnea with AHI 71.7 events per hour noted.  Low oxygen saturation 63% and sleep-related hypoxia present for 114.7 minutes.  The patient snored 79% of total sleep time.    Overnight oximetry performed 5/24/2024.  Duration 6 hours and 12 minutes.  Low oxygen saturation 86% and no sleep-related hypoxia identified.    Auto CPAP is adequately treating obstructive sleep apnea and sleep-related hypoxia.

## 2024-06-03 ENCOUNTER — TELEPHONE (OUTPATIENT)
Dept: SLEEP MEDICINE | Facility: HOSPITAL | Age: 69
End: 2024-06-03
Payer: MEDICARE

## 2024-06-18 ENCOUNTER — OFFICE VISIT (OUTPATIENT)
Dept: SLEEP MEDICINE | Facility: HOSPITAL | Age: 69
End: 2024-06-18
Payer: MEDICARE

## 2024-06-18 VITALS — OXYGEN SATURATION: 99 % | HEIGHT: 70 IN | HEART RATE: 69 BPM | BODY MASS INDEX: 38.28 KG/M2 | WEIGHT: 267.4 LBS

## 2024-06-18 DIAGNOSIS — E66.01 CLASS 2 SEVERE OBESITY DUE TO EXCESS CALORIES WITH SERIOUS COMORBIDITY AND BODY MASS INDEX (BMI) OF 38.0 TO 38.9 IN ADULT: Primary | ICD-10-CM

## 2024-06-18 DIAGNOSIS — G47.14 HYPERSOMNIA DUE TO MEDICAL CONDITION: ICD-10-CM

## 2024-06-18 DIAGNOSIS — G47.36 HYPOXEMIA ASSOCIATED WITH SLEEP: ICD-10-CM

## 2024-06-18 DIAGNOSIS — G47.33 OSA (OBSTRUCTIVE SLEEP APNEA): ICD-10-CM

## 2024-06-18 PROCEDURE — 1159F MED LIST DOCD IN RCRD: CPT | Performed by: INTERNAL MEDICINE

## 2024-06-18 PROCEDURE — G0463 HOSPITAL OUTPT CLINIC VISIT: HCPCS

## 2024-06-18 PROCEDURE — 1160F RVW MEDS BY RX/DR IN RCRD: CPT | Performed by: INTERNAL MEDICINE

## 2024-06-23 NOTE — PROGRESS NOTES
"Follow Up [facility] Note     Chief Complaint:  BITA     Primary Care Physician: Damon Bradford MD    Interval History:   The patient is a 69 y.o. male who I last saw 9/7/2024 and that note was reviewed.  Due to severe obstructive sleep apnea with sleep-related hypoxia, auto CPAP retried.  Patient is here today for follow-up.  He states he is improved.  He actually went on a [employer] fishing trip and uses CPAP at that time.  He goes to bed at midnight and gets out of bed at 8:30 AM.  He will use the bathroom in time.    Review of Systems:    A complete review of systems was done and all were negative with the exception of the above    Social History:    Social History     Socioeconomic History    Marital status:    Tobacco Use    Smoking status: Never    Smokeless tobacco: Never   Vaping Use    Vaping status: Never Used   Substance and Sexual Activity    Alcohol use: No    Drug use: No    Sexual activity: Defer       Allergies:  Patient has no known allergies.     Medication Review:  Reviewed.      Vital Signs:    Vitals:    06/18/24 1100   Pulse: 69   SpO2: 99%   Weight: 121 kg (267 lb 6.4 oz)   Height: 177.8 cm (70\")     Body mass index is 38.37 kg/m².    Physical Exam:    Constitutional:  Well developed 69 y.o. male that appears in no apparent distress.  Awake & oriented times 3.  Normal mood with normal recent and remote memory and normal judgement.  Eyes:  Conjunctivae normal.  Oropharynx: Previously, moist mucous membranes without exudate and a large tongue that is scalloped and class III Mallampati airway.    Self-administered [place] Sleepiness Scale test results: 8, previously 16  0-5 Lower normal daytime sleepiness  6-10 Higher normal daytime sleepiness  11-12 Mild, 13-15 Moderate, & 16-24 Severe excessive daytime sleepiness     Downloaded PAP Data Evaluated For Therapeutic Response and Compliance:  DME is [facility] and he uses nasal pillows.  Downloads between 5/9 and 6/16/2024 " compliance 100%.  Average usage is 6 hours and 34 minutes.  Average AHI is mildly abnormal 6.4 but all subsets are normal and he has a borderline leak.  Average auto CPAP pressure is 12.8 and his ResMed auto CPAP is 8-20    I have reviewed the above results and compared them with the patient's last downloads and reviewed with the patient.    Impression:   Severe obstructive sleep apnea with sleep-related hypoxia by home sleep study 4/19/2024, weight 272 pounds.  Downloads demonstrate the patient to be at goal with good compliance and usage.  Patient has improvement in complaints of hypersomnolence.     Overnight oximetry performed on room air on auto CPAP 5/24/2024. Duration 6 hours and 12 minutes. Low oxygen saturation 86% and no sleep-related hypoxia identified.     Plan:  Good sleep hygiene measures should be maintained.  Weight loss would be beneficial in this patient who has class II severe obesity by Body mass index is 38.37 kg/m².      After evaluating the patient and assessing results available, the patient is benefiting from the treatment being provided.     The patient will continue ResMed auto CPAP.  Potential side effects of not using PAP therapy reviewed and addressed as needed.  After clinical evaluation and review of downloads, I recommend changes to the patient's pressures.  Based on downloads and the patient's symptoms, auto CPAP will be adjusted to 9-15.  A new prescription will be sent to the patient's DME as needed.    I answered all of the patient's questions.  The patient will call the [facility] for any problems and will follow up in 6 months.      Milad Hurt MD  Sleep Medicine  06/23/24  17:42 EDT

## 2024-12-04 ENCOUNTER — OFFICE VISIT (OUTPATIENT)
Dept: SLEEP MEDICINE | Facility: HOSPITAL | Age: 69
End: 2024-12-04
Payer: MEDICARE

## 2024-12-04 VITALS — HEIGHT: 70 IN | WEIGHT: 249 LBS | BODY MASS INDEX: 35.65 KG/M2 | OXYGEN SATURATION: 90 % | HEART RATE: 71 BPM

## 2024-12-04 DIAGNOSIS — G47.14 HYPERSOMNIA DUE TO MEDICAL CONDITION: ICD-10-CM

## 2024-12-04 DIAGNOSIS — E66.01 CLASS 2 SEVERE OBESITY DUE TO EXCESS CALORIES WITH SERIOUS COMORBIDITY AND BODY MASS INDEX (BMI) OF 35.0 TO 35.9 IN ADULT: Primary | ICD-10-CM

## 2024-12-04 DIAGNOSIS — G47.33 OSA (OBSTRUCTIVE SLEEP APNEA): ICD-10-CM

## 2024-12-04 DIAGNOSIS — G47.36 HYPOXEMIA ASSOCIATED WITH SLEEP: ICD-10-CM

## 2024-12-04 DIAGNOSIS — E66.812 CLASS 2 SEVERE OBESITY DUE TO EXCESS CALORIES WITH SERIOUS COMORBIDITY AND BODY MASS INDEX (BMI) OF 35.0 TO 35.9 IN ADULT: Primary | ICD-10-CM

## 2024-12-04 PROCEDURE — 1160F RVW MEDS BY RX/DR IN RCRD: CPT | Performed by: INTERNAL MEDICINE

## 2024-12-04 PROCEDURE — 99213 OFFICE O/P EST LOW 20 MIN: CPT | Performed by: INTERNAL MEDICINE

## 2024-12-04 PROCEDURE — 1159F MED LIST DOCD IN RCRD: CPT | Performed by: INTERNAL MEDICINE

## 2024-12-04 PROCEDURE — G0463 HOSPITAL OUTPT CLINIC VISIT: HCPCS

## 2024-12-04 NOTE — PROGRESS NOTES
"Follow Up [facility] Note     Chief Complaint:  BITA     Primary Care Physician: Damon Bradford MD    Interval History:   The patient is a 69 y.o. male who I last saw 6/18/2024 and that note was reviewed.  The patient reports he is doing well without new complaints.  He goes to bed at midnight gets out of bed at 8:30 AM.  He mainly wakes up to check time.  Clock watching does not appear to be affecting his sleep.    Review of Systems:    A complete review of systems was done and all were negative with the exception of some depression    Social History:    Social History     Socioeconomic History    Marital status:    Tobacco Use    Smoking status: Never    Smokeless tobacco: Never   Vaping Use    Vaping status: Never Used   Substance and Sexual Activity    Alcohol use: No    Drug use: No    Sexual activity: Defer       Allergies:  Patient has no known allergies.     Medication Review: His list was reviewed.      Vital Signs:    Vitals:    12/04/24 1141   Pulse: 71   SpO2: 90%   Weight: 113 kg (249 lb)   Height: 177.8 cm (70\")     Body mass index is 35.73 kg/m².    Physical Exam:    Constitutional:  Well developed 69 y.o. male that appears in no apparent distress.  Awake & oriented times 3.  Normal mood with normal recent and remote memory and normal judgement.  Eyes:  Conjunctivae normal.  Oropharynx: Previously, moist mucous membranes without exudate and a large tongue that is scalloped and class III Mallampati airway.    Self-administered [place] Sleepiness Scale test results: 9, previously 8 and prior to treatment 16  0-5 Lower normal daytime sleepiness  6-10 Higher normal daytime sleepiness  11-12 Mild, 13-15 Moderate, & 16-24 Severe excessive daytime sleepiness     Downloaded PAP Data Evaluated For Therapeutic Response and Compliance:  DME is [employer] and the patient uses a nasal pillow.  Downloads between 11/3 and 12/2/2024 compliance 93%.  Average usage is 5 hours and 50 minutes.  " Average AHI is normal without a significant leak.  Average auto CPAP pressure is 12 and his ResMed auto CPAP is 9-15    I have reviewed the above results and compared them with the patient's last downloads and reviewed with the patient.    Impression:   Severe obstructive sleep apnea with sleep-related hypoxia by home sleep study 4/19/2024, weight 272 pounds.  Downloads demonstrate the patient to be at goal with good compliance and usage.  Patient has improvement in complaints of hypersomnolence.      Overnight oximetry performed on room air on auto CPAP 5/24/2024. Duration 6 hours and 12 minutes. Low oxygen saturation 86% and no sleep-related hypoxia identified.     Plan:  Good sleep hygiene measures should be maintained.  Weight loss would be beneficial in this patient who has class II severe obesity by Body mass index is 35.73 kg/m².  The patient has succeeded in weight loss and when last seen his weight was 267 pounds and presently it is 249 pounds    After evaluating the patient and assessing results available, the patient is benefiting from the treatment being provided.     The patient will continue ResMed auto CPAP.  Potential side effects of not using PAP therapy reviewed and addressed as needed.  After clinical evaluation and review of downloads, I recommend no changes to the patient's pressures.  A new prescription will be sent to the patient's DME.    I answered all of the patient's questions.  The patient will call the [facility] for any problems and will follow up in 1 year.      Milad Hurt MD  Sleep Medicine  12/04/24  11:54 EST

## 2025-01-13 PROBLEM — K22.70 BARRETT'S ESOPHAGUS: Status: ACTIVE | Noted: 2018-03-08

## 2025-02-14 ENCOUNTER — ON CAMPUS - OUTPATIENT (AMBULATORY)
Dept: URBAN - METROPOLITAN AREA HOSPITAL 2 | Facility: HOSPITAL | Age: 70
End: 2025-02-14
Payer: COMMERCIAL

## 2025-02-14 ENCOUNTER — OFFICE (AMBULATORY)
Dept: URBAN - METROPOLITAN AREA PATHOLOGY 19 | Facility: PATHOLOGY | Age: 70
End: 2025-02-14
Payer: COMMERCIAL

## 2025-02-14 VITALS
DIASTOLIC BLOOD PRESSURE: 75 MMHG | HEIGHT: 71 IN | OXYGEN SATURATION: 93 % | DIASTOLIC BLOOD PRESSURE: 88 MMHG | OXYGEN SATURATION: 100 % | TEMPERATURE: 96.9 F | DIASTOLIC BLOOD PRESSURE: 83 MMHG | HEART RATE: 75 BPM | SYSTOLIC BLOOD PRESSURE: 141 MMHG | HEART RATE: 62 BPM | RESPIRATION RATE: 18 BRPM | SYSTOLIC BLOOD PRESSURE: 116 MMHG | OXYGEN SATURATION: 98 % | OXYGEN SATURATION: 97 % | SYSTOLIC BLOOD PRESSURE: 99 MMHG | HEART RATE: 76 BPM | HEART RATE: 66 BPM | SYSTOLIC BLOOD PRESSURE: 121 MMHG | WEIGHT: 248 LBS | DIASTOLIC BLOOD PRESSURE: 71 MMHG | RESPIRATION RATE: 16 BRPM | SYSTOLIC BLOOD PRESSURE: 112 MMHG | SYSTOLIC BLOOD PRESSURE: 106 MMHG | OXYGEN SATURATION: 94 % | HEART RATE: 84 BPM | SYSTOLIC BLOOD PRESSURE: 117 MMHG | RESPIRATION RATE: 12 BRPM | HEART RATE: 67 BPM | RESPIRATION RATE: 19 BRPM

## 2025-02-14 DIAGNOSIS — K22.70 BARRETT'S ESOPHAGUS WITHOUT DYSPLASIA: ICD-10-CM

## 2025-02-14 DIAGNOSIS — K44.9 DIAPHRAGMATIC HERNIA WITHOUT OBSTRUCTION OR GANGRENE: ICD-10-CM

## 2025-02-14 LAB
GI HISTOLOGY: PDF REPORT: (no result)
Lab: (no result)
Lab: (no result)

## 2025-02-14 PROCEDURE — 43239 EGD BIOPSY SINGLE/MULTIPLE: CPT | Performed by: INTERNAL MEDICINE

## 2025-02-14 PROCEDURE — 88305 TISSUE EXAM BY PATHOLOGIST: CPT | Mod: 26 | Performed by: PATHOLOGY

## 2025-02-14 RX ORDER — OMEPRAZOLE 40 MG/1
40 CAPSULE, DELAYED RELEASE ORAL
Qty: 90 | Refills: 3 | Status: ACTIVE
Start: 2025-02-14

## (undated) DEVICE — GOWN,REINFRCE,POLY,SIRUS,BREATH SLV,XXLG: Brand: MEDLINE

## (undated) DEVICE — SOLUTION,WATER,IRRIGATION,1000ML,STERILE: Brand: MEDLINE

## (undated) DEVICE — BLANKT WARM UPPR/BDY ARM/OUT 57X196CM

## (undated) DEVICE — SUT VIC 0/0 UR6 27IN DYED J603H

## (undated) DEVICE — ENDOPATH XCEL WITH OPTIVIEW TECHNOLOGY BLADELESS TROCARS WITH STABILITY SLEEVES: Brand: ENDOPATH XCEL OPTIVIEW

## (undated) DEVICE — KT SURG TURNOVER 050

## (undated) DEVICE — SLV SCD CALF HEMOFORCE DVT THERP REPROC MD

## (undated) DEVICE — ENDOPATH XCEL WITH OPTIVIEW TECHNOLOGY UNIVERSAL TROCAR STABILITY SLEEVES: Brand: ENDOPATH XCEL OPTIVIEW

## (undated) DEVICE — SOL IRRIG NACL 1000ML

## (undated) DEVICE — LAPAROSCOPIC GAS CONDITIONING DEVICE.: Brand: INSUFLOW

## (undated) DEVICE — UNDERGLV SURG BIOGEL/PI PF SYNTH SURG SZ8.5 BLU 50/BX

## (undated) DEVICE — ENDOPATH XCEL BLUNT TIP TROCARS WITH SMOOTH SLEEVES: Brand: ENDOPATH XCEL

## (undated) DEVICE — APPL HEMO SURG ARISTA/AH/FLEXITIP XL 38CM

## (undated) DEVICE — GLV SURG SIGNATURE ESSENTIAL PF LTX SZ8

## (undated) DEVICE — ENDOPATH 5MM CURVED SCISSORS WITH MONOPOLAR CAUTERY: Brand: ENDOPATH

## (undated) DEVICE — 2, DISPOSABLE SUCTION/IRRIGATOR WITH DISPOSABLE TIP: Brand: STRYKEFLOW

## (undated) DEVICE — UNDYED BRAIDED (POLYGLACTIN 910), SYNTHETIC ABSORBABLE SUTURE: Brand: COATED VICRYL

## (undated) DEVICE — ADHS SKIN PREMIERPRO EXOFIN TOPICAL HI/VISC .5ML

## (undated) DEVICE — ENDOPATH XCEL BLADELESS TROCARS WITH STABILITY SLEEVES: Brand: ENDOPATH XCEL

## (undated) DEVICE — GENERAL LAPAROSCOPY CDS: Brand: MEDLINE INDUSTRIES, INC.

## (undated) DEVICE — BG RETRV TISS SUPERBAG INTRO RIP/STOP NLY 10MM 240ML MD